# Patient Record
Sex: MALE | Race: WHITE
[De-identification: names, ages, dates, MRNs, and addresses within clinical notes are randomized per-mention and may not be internally consistent; named-entity substitution may affect disease eponyms.]

---

## 2017-04-05 ENCOUNTER — HOSPITAL ENCOUNTER (EMERGENCY)
Dept: HOSPITAL 58 - ED | Age: 37
Discharge: HOME | End: 2017-04-05

## 2017-04-05 VITALS — TEMPERATURE: 98.8 F | DIASTOLIC BLOOD PRESSURE: 79 MMHG | SYSTOLIC BLOOD PRESSURE: 127 MMHG

## 2017-04-05 VITALS — BODY MASS INDEX: 44.3 KG/M2

## 2017-04-05 DIAGNOSIS — L23.7: Primary | ICD-10-CM

## 2017-04-05 PROCEDURE — 96372 THER/PROPH/DIAG INJ SC/IM: CPT

## 2017-04-05 PROCEDURE — 99282 EMERGENCY DEPT VISIT SF MDM: CPT

## 2017-04-05 NOTE — ED.PDOC
General


ED Provider: 


Dr. JOSE MEYERS JR





Chief Complaint: Rash


Stated Complaint: clearing and burning field 3 days ago--felt itching start to 

left eyelid and area around eye--thinks it is poison ivy--rash starting to 

spread--redness noted--he states it has been draining[End]98.8 82 20 94 127/79 2

/10


Time Seen by Physician: 11:26


Mode of Arrival: Walk-In


Information Source: Patient


Exam Limitations: No limitations


Primary Care Provider: 


STORM BURRELL





Nursing and Triage Documentation Reviewed and Agree: No





Review of Systems





- Review Of Systems


Constitutional: Reports: No symptoms


Eyes: Reports: Drainage, Pain


Ears, Nose, Mouth, Throat: Denies: Ear pain


Respiratory: Reports: No symptoms


Cardiac: Reports: No symptoms


GI: Reports: No symptoms


: Reports: No symptoms


Musculoskeletal: Reports: No symptoms


Skin: Reports: Rash (edema red streaking)


Neurological: Reports: No symptoms


Endocrine: Reports: No symptoms


Hematologic/Lymphatic: Reports: No symptoms


All Other Systems: Other





Past Medical History





- Past Medical History


Previously Healthy: Yes


Endocrine: Reports: None


Cardiovascular: Reports: DVT (has seen Dr Prieto- now seeing nurse in [place]- 

recommend work up for thrombophlebitis and recurrent edema), Other (DVT LEFT 

LEG )


Respiratory: Reports: None


Hematological: Reports: None


Gastrointestinal: Reports: GERD


Genitourinary: Reports: None


Neuro/Psych: Reports: None


Musculoskeletal: Reports: None


Cancer: Reports: None


Other Pertinent Past Medical History: right chest wall mass(appears to have 

symmetric adipose tissue)





- Surgical History


General Surgical History: Reports: Other (KNOT ON TAILBONE[ End ]CYST ON 

TAILBONE)





- Family History


Family History: Reports: None





- Social History


Smoking Status: Never smoker


Hx Substance Use: Yes (IN THE PAST)


Alcohol Screening: Occasionally





Physical Exam





- Physical Exam


Appearance: Well-appearing, Obese


Pain Distress: Moderate


Eyes: ROSS, EOMI, Conjunctiva clear (left facial erythema and edema"I get this 

most every year")





Critical Care Note





- Critical Care Note


Total Time (mins): 0





Course





- Course


Vital Signs: 





 











  Temp Pulse Resp BP Pulse Ox


 


 04/05/17 11:03  98.8 F  81  20  127/79  94 L














Departure





- Departure


Time of Disposition: 12:18


Disposition: HOME SELF-CARE


Discharge Problem: 


 Pruritic rash





Instructions:  Poison Ivy (ED)


Condition: Good


Pt referred to PMD for follow-up: Yes


Additional Instructions: 


MAY BEGIN PREDNISONE DOSE PACK- ONLY IF RASH RETURNS


DISCUSS ALLERGY REFERRAL WITH YOUR PHYSICIAN


CONTINUE BENADRYL OR NON SEDATING ANTIHISTAMINE AS NEEDED FOR ITCHING


Allergies/Adverse Reactions: 


Allergies





No Known Allergies Allergy (Verified 04/05/17 11:10)


 








Home Medications: 


Ambulatory Orders





Fluoxetine HCl [Prozac] 20 mg PO DAILY 04/05/17 


Gabapentin 300 mg PO TID 04/05/17

## 2017-06-21 ENCOUNTER — HOSPITAL ENCOUNTER (OUTPATIENT)
Dept: HOSPITAL 58 - RAD | Age: 37
Discharge: HOME | End: 2017-06-21
Attending: PHYSICIAN ASSISTANT

## 2017-06-21 VITALS — BODY MASS INDEX: 44.3 KG/M2

## 2017-06-21 DIAGNOSIS — R60.0: Primary | ICD-10-CM

## 2017-06-21 DIAGNOSIS — R53.83: ICD-10-CM

## 2017-06-21 PROCEDURE — 36415 COLL VENOUS BLD VENIPUNCTURE: CPT

## 2017-06-21 PROCEDURE — 84403 ASSAY OF TOTAL TESTOSTERONE: CPT

## 2017-06-21 PROCEDURE — 83880 ASSAY OF NATRIURETIC PEPTIDE: CPT

## 2017-06-21 PROCEDURE — 93010 ELECTROCARDIOGRAM REPORT: CPT

## 2017-06-21 PROCEDURE — 93005 ELECTROCARDIOGRAM TRACING: CPT

## 2017-06-21 NOTE — DI
Exam:  Two x-rays of the chest. 

  

Comparison:  CTA performed 11/28/2014. 

  

Reason for exam:  Edema. 

  

FINDINGS:  No pneumothorax, pleural effusion, or focal consolidation.  The cardiac silhouette is not
 borderline within normal limits.  The imaged osseous structures are unremarkable. 

  

Impression:  No acute cardiopulmonary process.

## 2017-06-22 LAB — TESTOST SERPL-MCNC: 102 NG/DL (ref 348–1197)

## 2017-07-07 ENCOUNTER — HOSPITAL ENCOUNTER (OUTPATIENT)
Dept: HOSPITAL 58 - LAB | Age: 37
Discharge: HOME | End: 2017-07-07
Attending: PHYSICIAN ASSISTANT

## 2017-07-07 VITALS — BODY MASS INDEX: 44.3 KG/M2

## 2017-07-07 DIAGNOSIS — R79.89: Primary | ICD-10-CM

## 2017-07-07 PROCEDURE — 36415 COLL VENOUS BLD VENIPUNCTURE: CPT

## 2017-07-07 PROCEDURE — 84153 ASSAY OF PSA TOTAL: CPT

## 2017-07-22 ENCOUNTER — HOSPITAL ENCOUNTER (EMERGENCY)
Dept: HOSPITAL 58 - ED | Age: 37
Discharge: HOME | End: 2017-07-22

## 2017-07-22 VITALS — TEMPERATURE: 97.2 F | DIASTOLIC BLOOD PRESSURE: 83 MMHG | SYSTOLIC BLOOD PRESSURE: 142 MMHG

## 2017-07-22 VITALS — BODY MASS INDEX: 42.7 KG/M2

## 2017-07-22 DIAGNOSIS — T67.5XXA: Primary | ICD-10-CM

## 2017-07-22 LAB
ADD URINE MICROSCOPIC: YES
ALBUMIN SERPL-MCNC: 3.8 G/DL (ref 3.4–5)
ALBUMIN/GLOB SERPL: 1.06 {RATIO}
ALP SERPL-CCNC: 65 U/L (ref 50–136)
ALT SERPL-CCNC: 21 U/L (ref 12–78)
ANION GAP SERPL CALC-SCNC: 15 MMOL/L
AST SERPL-CCNC: 20 U/L (ref 15–37)
BASOPHILS # BLD AUTO: 0 K/UL (ref 0–0.2)
BASOPHILS NFR BLD AUTO: 0.3 % (ref 0–3)
BILIRUB SERPL-MCNC: 0.44 MG/DL (ref 0–1.2)
BUN SERPL-MCNC: 12 MG/DL (ref 7–18)
BUN/CREAT SERPL: 14.63
CALCIUM SERPL-MCNC: 9 MG/DL (ref 8.2–10.2)
CHLORIDE SERPL-SCNC: 106 MMOL/L (ref 98–107)
CO2 BLD-SCNC: 24 MMOL/L (ref 21–32)
CREAT SERPL-MCNC: 0.82 MG/DL (ref 0.6–1.1)
EOSINOPHIL # BLD AUTO: 0.2 K/UL (ref 0–0.7)
EOSINOPHIL NFR BLD AUTO: 2.2 % (ref 0–7)
GFR SERPLBLD BASED ON 1.73 SQ M-ARVRAT: 105 ML/MIN
GLOBULIN SER CALC-MCNC: 3.6 G/L
GLUCOSE SERPL-MCNC: 95 MG/DL (ref 70–100)
HCT VFR BLD AUTO: 40.5 % (ref 42–52)
HGB BLD-MCNC: 13.9 G/DL (ref 14–18)
IMM GRANULOCYTES NFR BLD AUTO: 0.3 % (ref 0–5)
LYMPHOCYTES # SPEC AUTO: 1.9 K/UL (ref 0.6–3.4)
LYMPHOCYTES NFR BLD AUTO: 24.7 % (ref 10–50)
MCH RBC QN: 31.8 PG (ref 27–31)
MCHC RBC AUTO-ENTMCNC: 34.3 G/DL (ref 31.8–35.4)
MCV RBC: 92.7 FL (ref 80–94)
MONOCYTES # BLD AUTO: 0.5 K/UL (ref 0.4–2)
MONOCYTES NFR BLD AUTO: 6.9 % (ref 0–10)
NEUTROPHILS # BLD AUTO: 5 K/UL (ref 2–6.9)
NEUTROPHILS NFR BLD AUTO: 65.6 %
PH UR: 5.5 [PH] (ref 5–9)
PLATELET # BLD AUTO: 198 10^3/UL (ref 140–440)
POTASSIUM SERPL-SCNC: 4 MMOL/L (ref 3.5–5.1)
PROT SERPL-MCNC: 7.4 G/DL (ref 6.4–8.2)
RBC # BLD AUTO: 4.37 10^6/UL (ref 4.7–6.1)
SODIUM SERPL-SCNC: 141 MMOL/L (ref 136–145)
SP GR UR: >=1.03 (ref 1–1.03)
WBC # BLD AUTO: 7.64 K/UL (ref 4.2–10.2)

## 2017-07-22 PROCEDURE — 99283 EMERGENCY DEPT VISIT LOW MDM: CPT

## 2017-07-22 PROCEDURE — 36415 COLL VENOUS BLD VENIPUNCTURE: CPT

## 2017-07-22 PROCEDURE — 81001 URINALYSIS AUTO W/SCOPE: CPT

## 2017-07-22 PROCEDURE — 80053 COMPREHEN METABOLIC PANEL: CPT

## 2017-07-22 PROCEDURE — 96372 THER/PROPH/DIAG INJ SC/IM: CPT

## 2017-07-22 PROCEDURE — 85025 COMPLETE CBC W/AUTO DIFF WBC: CPT

## 2017-07-22 NOTE — ED.PDOC
General


ED Provider: 


Dr. JOSE MEYERS JR





Chief Complaint: Nausea/Vomiting


Stated Complaint: patient states that he has been working outside in the heat.  

states he has been trying to drink and was doing ok until he ate yesterday.  

states that he ate and then started to vomit.  states every time he eats he 

vomits.[ End ]97.2 95 16 142/83


Time Seen by Physician: 10:24


Mode of Arrival: Walk-In


Information Source: Patient


Exam Limitations: No limitations


Primary Care Provider: 


LYNDSAY ABRAHAM





Nursing and Triage Documentation Reviewed and Agree: No





Review of Systems





- Review Of Systems


Constitutional: Reports: Malaise, Weakness


Eyes: Reports: No symptoms


Ears, Nose, Mouth, Throat: Reports: No symptoms


Respiratory: Reports: No symptoms


Cardiac: Reports: No symptoms


GI: Reports: Nausea, Poor appetite, Vomiting


: Reports: No symptoms


Musculoskeletal: Reports: No symptoms


Skin: Reports: Bruising


Neurological: Reports: Weakness


Endocrine: Reports: Excessive sweating


Hematologic/Lymphatic: Reports: Easy bruising


All Other Systems: Other





Past Medical History





- Past Medical History


Previously Healthy: Yes


Endocrine: Reports: None


Cardiovascular: Reports: DVT (has seen Dr Prieto- now seeing nurse in [place]- 

recommend work up for thrombophlebitis and recurrent edema), Other (DVT LEFT 

LEG )


Respiratory: Reports: None


Hematological: Reports: None


Gastrointestinal: Reports: GERD


Genitourinary: Reports: None


Neuro/Psych: Reports: None


Musculoskeletal: Reports: None


Cancer: Reports: None


Other Pertinent Past Medical History: right chest wall mass(appears to have 

symmetric adipose tissue)





- Surgical History


General Surgical History: Reports: Other (KNOT ON TAILBONE[ End ]CYST ON 

TAILBONE)





- Family History


Family History: Reports: None





- Social History


Smoking Status: Never smoker


Hx Substance Use: Yes (IN THE PAST)


Alcohol Screening: Occasionally





Physical Exam





- Physical Exam


Appearance: Well-appearing, Obese


Pain Distress: Moderate


Eyes: ROSS, EOMI, Conjunctiva clear


ENT: Ears normal, Nose normal, Oropharynx normal


Neck: Supple


Respiratory: Airway patent, Breath sounds clear, Breath sounds equal, 

Respirations nonlabored


Cardiovascular: RRR, Pulses normal, No rub, No murmur


GI/: Soft, Nontender, No masses, Bowel sounds normal, No Organomegaly


Musculoskeletal: Normal strength, ROM intact, No edema, No calf tenderness


Skin: Warm, Dry, Normal color (note abdominal macular ecchymoses)


Neurological: Sensation intact, Motor intact, Reflexes intact, Cranial nerves 

intact, Alert, Oriented





Critical Care Note





- Critical Care Note


Total Time (mins): 5





Course





- Course


Hematology/Chemistry: 


 07/22/17 10:45





 07/22/17 10:45


Orders, Labs, Meds: 


Lab Review











  07/22/17 07/22/17





  10:45 11:15


 


WBC  7.64 


 


RBC  4.37 L 


 


Hgb  13.9 L 


 


Hct  40.5 L 


 


MCV  92.7 


 


MCH  31.8 H 


 


MCHC  34.3 


 


RDW Coeff of Keeley  13.2 


 


Plt Count  198 


 


Immature Gran % (Auto)  0.3 


 


Neut % (Auto)  65.6 


 


Lymph % (Auto)  24.7 


 


Mono % (Auto)  6.9 


 


Eos % (Auto)  2.2 


 


Baso % (Auto)  0.3 


 


Immature Gran # (Auto)  0.0 


 


Neut #  5.0 


 


Lymph #  1.9 


 


Mono #  0.5 


 


Eos #  0.2 


 


Baso #  0.0 


 


Sodium  141 


 


Potassium  4.0 


 


Chloride  106 


 


Carbon Dioxide  24 


 


Anion Gap  15.0 


 


BUN  12 


 


Creatinine  0.82 


 


Estimated GFR (MDRD)  105.00 


 


BUN/Creatinine Ratio  14.63 


 


Glucose  95 


 


Calcium  9.0 


 


Total Bilirubin  0.44 


 


AST  20 


 


ALT  21 


 


Alkaline Phosphatase  65 


 


Total Protein  7.4 


 


Albumin  3.8 


 


Globulin  3.6 


 


Albumin/Globulin Ratio  1.06 


 


Urine Color   Yellow


 


Urine Clarity   Clear


 


Urine pH   5.5


 


Ur Specific Gravity   >=1.030


 


Urine Protein   Trace


 


Urine Glucose (UA)   Negative


 


Urine Ketones   Negative


 


Urine Blood   Negative


 


Urine Nitrite   Negative


 


Urine Bilirubin   Negative


 


Urine Urobilinogen   0.2


 


Ur Leukocyte Esterase   Negative


 


Ur Squamous Epith Cells   Not present


 


Urine Mucus   2+








Orders











 Category Date Time Status


 


 CBC W/ AUTO DIFF Stat LAB  07/22/17 10:45 Completed


 


 COMPREHENSIVE METABOLIC PANEL Stat LAB  07/22/17 10:45 Completed


 


 URINALYSIS C & S IF INDICATED Stat LAB  07/22/17 11:15 Completed


 


 Ondansetron HCl/Pf [Zofran 4 mg/2 ml] MEDS  07/22/17 10:25 Discontinued





 4 mg IM ONCE STA   








Medications














Discontinued Medications














Generic Name Dose Route Start Last Admin





  Trade Name Freq  PRN Reason Stop Dose Admin


 


Ondansetron HCl  4 mg  07/22/17 10:25  07/22/17 10:49





  Zofran 4 Mg/2 Ml  IM  07/22/17 10:26  4 mg





  ONCE STA   Administration











Vital Signs: 


 











  Temp Pulse Resp BP Pulse Ox


 


 07/22/17 10:15  97.2 F L  95 H  16  142/83 H  95














Departure





- Departure


Time of Disposition: 12:33


Disposition: HOME SELF-CARE


Discharge Problem: 


Heat exhaustion


Qualifiers:


 Encounter type: initial encounter Qualifier Code: (T67.5XXA) Heat exhaustion, 

unspecified, initial encounter





Instructions:  Heat Exhaustion (ED)


Condition: Good


Pt referred to PMD for follow-up: Yes


Additional Instructions: 


increase fluids 


avoid heat for next three days


when feeling well begin outdoor work for two hours only a day for 2-3 days 


then build up by two hours every 2-3 days until able to work through day





increase fluids- sufficient fluids will cause urination more than three times a 

day 


adequate fluids are indicated by urine that is without color


follow up PMD 3-4 days


discuss easy bruising and heat exhaustion


Allergies/Adverse Reactions: 


Allergies





No Known Allergies Allergy (Verified 07/22/17 10:18)


 








Home Medications: 


Ambulatory Orders





Buprenorphine HCl/Naloxone HCl [Suboxone 8 mg-2 mg Sl Film] 1 each SL BID 07/22/ 17 


Testosterone Cypionate [Depo-Testosterone] 100 mg IM AS DIRECTED 07/22/17

## 2017-08-09 ENCOUNTER — TRANSCRIBE ORDERS (OUTPATIENT)
Dept: ADMINISTRATIVE | Facility: HOSPITAL | Age: 37
End: 2017-08-09

## 2017-08-09 DIAGNOSIS — R20.2 PARESTHESIA: Primary | ICD-10-CM

## 2017-08-12 ENCOUNTER — HOSPITAL ENCOUNTER (OUTPATIENT)
Dept: HOSPITAL 58 - LAB | Age: 37
Discharge: HOME | End: 2017-08-12
Attending: PHYSICIAN ASSISTANT

## 2017-08-12 VITALS — BODY MASS INDEX: 42.7 KG/M2

## 2017-08-12 DIAGNOSIS — L28.2: Primary | ICD-10-CM

## 2017-08-12 DIAGNOSIS — E29.1: ICD-10-CM

## 2017-08-12 LAB
ALBUMIN SERPL-MCNC: 3.8 G/DL (ref 3.4–5)
ALBUMIN/GLOB SERPL: 1.12 {RATIO}
ALP SERPL-CCNC: 64 U/L (ref 50–136)
ALT SERPL-CCNC: 24 U/L (ref 12–78)
ANION GAP SERPL CALC-SCNC: 12.9 MMOL/L
AST SERPL-CCNC: 23 U/L (ref 15–37)
BASOPHILS # BLD AUTO: 0 K/UL (ref 0–0.2)
BASOPHILS NFR BLD AUTO: 0.3 % (ref 0–3)
BILIRUB SERPL-MCNC: 0.68 MG/DL (ref 0–1.2)
BUN SERPL-MCNC: 12 MG/DL (ref 7–18)
BUN/CREAT SERPL: 12.63
CALCIUM SERPL-MCNC: 9.3 MG/DL (ref 8.2–10.2)
CHLORIDE SERPL-SCNC: 101 MMOL/L (ref 98–107)
CO2 BLD-SCNC: 28 MMOL/L (ref 21–32)
CREAT SERPL-MCNC: 0.95 MG/DL (ref 0.6–1.1)
EOSINOPHIL # BLD AUTO: 0.2 K/UL (ref 0–0.7)
EOSINOPHIL NFR BLD AUTO: 2.5 % (ref 0–7)
GFR SERPLBLD BASED ON 1.73 SQ M-ARVRAT: 88 ML/MIN
GLOBULIN SER CALC-MCNC: 3.4 G/L
GLUCOSE SERPL-MCNC: 103 MG/DL (ref 70–100)
HCT VFR BLD AUTO: 41.2 % (ref 42–52)
HGB BLD-MCNC: 14.1 G/DL (ref 14–18)
IMM GRANULOCYTES NFR BLD AUTO: 0.2 % (ref 0–5)
LYMPHOCYTES # SPEC AUTO: 1.5 K/UL (ref 0.6–3.4)
LYMPHOCYTES NFR BLD AUTO: 22.9 % (ref 10–50)
MCH RBC QN: 31.6 PG (ref 27–31)
MCHC RBC AUTO-ENTMCNC: 34.2 G/DL (ref 31.8–35.4)
MCV RBC: 92.4 FL (ref 80–94)
MONOCYTES # BLD AUTO: 0.6 K/UL (ref 0.4–2)
MONOCYTES NFR BLD AUTO: 9 % (ref 0–10)
NEUTROPHILS # BLD AUTO: 4.1 K/UL (ref 2–6.9)
NEUTROPHILS NFR BLD AUTO: 65.1 %
PLATELET # BLD AUTO: 192 10^3/UL (ref 140–440)
POTASSIUM SERPL-SCNC: 3.9 MMOL/L (ref 3.5–5.1)
PROT SERPL-MCNC: 7.2 G/DL (ref 6.4–8.2)
RBC # BLD AUTO: 4.46 10^6/UL (ref 4.7–6.1)
SODIUM SERPL-SCNC: 138 MMOL/L (ref 136–145)
WBC # BLD AUTO: 6.34 K/UL (ref 4.2–10.2)

## 2017-08-12 PROCEDURE — 84403 ASSAY OF TOTAL TESTOSTERONE: CPT

## 2017-08-12 PROCEDURE — 84402 ASSAY OF FREE TESTOSTERONE: CPT

## 2017-08-12 PROCEDURE — 80053 COMPREHEN METABOLIC PANEL: CPT

## 2017-08-12 PROCEDURE — 36415 COLL VENOUS BLD VENIPUNCTURE: CPT

## 2017-08-12 PROCEDURE — 85025 COMPLETE CBC W/AUTO DIFF WBC: CPT

## 2017-08-14 LAB — TESTOST SERPL-MCNC: 382 NG/DL (ref 264–916)

## 2017-08-29 RX ORDER — BUPRENORPHINE HYDROCHLORIDE AND NALOXONE HYDROCHLORIDE DIHYDRATE 8; 2 MG/1; MG/1
3 TABLET SUBLINGUAL DAILY
COMMUNITY

## 2017-08-29 RX ORDER — EPINEPHRINE 0.15 MG/.3ML
INJECTION INTRAMUSCULAR
COMMUNITY

## 2017-08-29 RX ORDER — CEPHALEXIN 500 MG/1
500 CAPSULE ORAL 2 TIMES DAILY
COMMUNITY
End: 2018-05-13

## 2017-08-29 RX ORDER — TESTOSTERONE CYPIONATE 200 MG/ML
200 INJECTION, SOLUTION INTRAMUSCULAR
COMMUNITY
End: 2018-05-13

## 2017-08-29 RX ORDER — BACITRACIN ZINC 500 [USP'U]/G
OINTMENT TOPICAL 2 TIMES DAILY
COMMUNITY
End: 2018-01-15

## 2017-09-07 ENCOUNTER — HOSPITAL ENCOUNTER (OUTPATIENT)
Dept: HOSPITAL 58 - CAR | Age: 37
Discharge: HOME | End: 2017-09-07
Attending: PHYSICIAN ASSISTANT

## 2017-09-07 VITALS — BODY MASS INDEX: 42.7 KG/M2

## 2017-09-07 DIAGNOSIS — E29.1: Primary | ICD-10-CM

## 2017-09-07 DIAGNOSIS — R60.0: ICD-10-CM

## 2017-09-07 LAB
ALBUMIN SERPL-MCNC: 3.6 G/DL (ref 3.4–5)
ALBUMIN/GLOB SERPL: 0.95 {RATIO}
ALP SERPL-CCNC: 70 U/L (ref 50–136)
ALT SERPL-CCNC: 18 U/L (ref 12–78)
ANION GAP SERPL CALC-SCNC: 13.2 MMOL/L
AST SERPL-CCNC: 17 U/L (ref 15–37)
BASOPHILS # BLD AUTO: 0 K/UL (ref 0–0.2)
BASOPHILS NFR BLD AUTO: 0.3 % (ref 0–3)
BILIRUB SERPL-MCNC: 0.44 MG/DL (ref 0–1.2)
BUN SERPL-MCNC: 14 MG/DL (ref 7–18)
BUN/CREAT SERPL: 15.21
CALCIUM SERPL-MCNC: 9.3 MG/DL (ref 8.2–10.2)
CHLORIDE SERPL-SCNC: 102 MMOL/L (ref 98–107)
CHOLEST SERPL-MCNC: 224 MG/DL (ref 0–200)
CHOLEST/HDLC SERPL: 5.9 {RATIO} (ref 4.5–6.4)
CO2 BLD-SCNC: 28 MMOL/L (ref 21–32)
CREAT SERPL-MCNC: 0.92 MG/DL (ref 0.6–1.1)
EOSINOPHIL # BLD AUTO: 0.2 K/UL (ref 0–0.7)
EOSINOPHIL NFR BLD AUTO: 2.7 % (ref 0–7)
GFR SERPLBLD BASED ON 1.73 SQ M-ARVRAT: 92 ML/MIN
GLOBULIN SER CALC-MCNC: 3.8 G/L
GLUCOSE SERPL-MCNC: 98 MG/DL (ref 70–100)
HCT VFR BLD AUTO: 42.6 % (ref 42–52)
HDLC SERPL-MCNC: 38 MG/DL (ref 35–60)
HGB BLD-MCNC: 14.5 G/DL (ref 14–18)
IMM GRANULOCYTES NFR BLD AUTO: 0.3 % (ref 0–5)
LYMPHOCYTES # SPEC AUTO: 2 K/UL (ref 0.6–3.4)
LYMPHOCYTES NFR BLD AUTO: 32.8 % (ref 10–50)
MCH RBC QN: 31.3 PG (ref 27–31)
MCHC RBC AUTO-ENTMCNC: 34 G/DL (ref 31.8–35.4)
MCV RBC: 91.8 FL (ref 80–94)
MONOCYTES # BLD AUTO: 0.5 K/UL (ref 0.4–2)
MONOCYTES NFR BLD AUTO: 7.7 % (ref 0–10)
NEUTROPHILS # BLD AUTO: 3.4 K/UL (ref 2–6.9)
NEUTROPHILS NFR BLD AUTO: 56.2 %
PLATELET # BLD AUTO: 208 10^3/UL (ref 140–440)
POTASSIUM SERPL-SCNC: 4.2 MMOL/L (ref 3.5–5.1)
PROT SERPL-MCNC: 7.4 G/DL (ref 6.4–8.2)
RBC # BLD AUTO: 4.64 10^6/UL (ref 4.7–6.1)
SODIUM SERPL-SCNC: 139 MMOL/L (ref 136–145)
TRIGL SERPL-MCNC: 161 MG/DL (ref 30–150)
VLDLC SERPL CALC-MCNC: 32 MG/DL (ref 2–30)
WBC # BLD AUTO: 6 K/UL (ref 4.2–10.2)

## 2017-09-07 PROCEDURE — 80053 COMPREHEN METABOLIC PANEL: CPT

## 2017-09-07 PROCEDURE — 85025 COMPLETE CBC W/AUTO DIFF WBC: CPT

## 2017-09-07 PROCEDURE — 80061 LIPID PANEL: CPT

## 2017-09-07 PROCEDURE — 36415 COLL VENOUS BLD VENIPUNCTURE: CPT

## 2017-09-07 NOTE — ECHO2D
Date of Exam: 9/7/17   Ordering Physician: LYNDSAY ABRAHAM            

Reason for Echo: EDEMA LOWER EXTREMITIES







M-Mode  Normal Adult Results LV Dimensions Normal Adult Results

 

AoV Opening excursions       >1.6  >1.6 LVEDD-base-     3.5-5.8  5.0

 

Ao root dimensions      2.0-3.7  3.1 LVESD-base-     3.1-4.6  

 

L. Atrium dimensions      1.9-3.8  4.9 Post. Wall thickness     0.8-1.1  1.1

 

IV septum (thickness)      0.7-1.2  1.3 Post. Wall excursion     0.72-1.3  
NORMAL

 

Septal motion   NORMAL Systolic motion   

 

R. Ventricular cavity     1.5-2.0  NORMAL LVEF       60%  54%

 

Paradoxical septal wall motion   NORMAL    



2-D : ENLARGED LEFT ATRIAL CAVITY--NORMAL LEFT VENTRICULAR CONTRACTILITY--
NORMAL VALVES-NO EFFUSION, NO THROMBUS





M-MODE:

MV:  NORMAL

AV:  NORMAL

TV:  NORMAL

PV:  

CHAMBER SIZE:  ENLARGED LEFT ATRIAL CAVITY

WALL MOTION:  NORMAL

PERICARDIUM:  NORMAL



INTERPRETATION:  

1. LEFT VENTRICULAR HYPERTROPHY

2. ENLARGED LEFT ATRIAL CAVITY

3. NORMAL LEFT VENTRICULAR CONTRACTILITY

4. NORMAL VALVES

MTDD

## 2017-09-13 ENCOUNTER — APPOINTMENT (OUTPATIENT)
Dept: NEUROLOGY | Facility: HOSPITAL | Age: 37
End: 2017-09-13

## 2017-10-03 ENCOUNTER — TELEPHONE (OUTPATIENT)
Dept: VASCULAR SURGERY | Facility: CLINIC | Age: 37
End: 2017-10-03

## 2017-10-11 ENCOUNTER — TELEPHONE (OUTPATIENT)
Dept: PODIATRY | Facility: CLINIC | Age: 37
End: 2017-10-11

## 2017-10-11 NOTE — TELEPHONE ENCOUNTER
Spoke with Mr. De Luna and reminded him of his appointment at 10 am, he stated he would be here his toes were killing him. He thanked us for the reminder as he thought the appointment was this morning and that he had overslept. He says he looks forward to seeing us in the morning.

## 2017-10-31 ENCOUNTER — HOSPITAL ENCOUNTER (OUTPATIENT)
Dept: HOSPITAL 58 - RAD | Age: 37
Discharge: HOME | End: 2017-10-31
Attending: PHYSICIAN ASSISTANT

## 2017-10-31 VITALS — BODY MASS INDEX: 42.7 KG/M2

## 2017-10-31 DIAGNOSIS — M54.5: Primary | ICD-10-CM

## 2017-10-31 NOTE — CT
EXAM:  CT abdomen pelvis without contrast 

  

HISTORY:  Low back pain, right sciatic pain into groin 

  

COMPARISON:  09/10/2016 

  

TECHNIQUE:  CT abdomen pelvis performed without intravenous contrast.  Coronal and sagittal reformatt
ed images obtained. 

  

FINDINGS:  Lung bases clear.  No free air.  No acute abnormalities of the bones.  Degenerative change
 in the spine with multilevel neural foraminal narrowing, appears moderate to severe at L5-S1.  Heart
 normal in size.  Liver appears normal.  Gallbladder appears normal.  Pancreas appears normal.  Splee
n appears normal.  Adrenals appear normal.  Kidneys appear normal without hydronephrosis or nephrolit
hiasis.  No calculi visualized in normal course of the ureters.  Bladder unremarkable.  Prostate norm
al in size.  Small bilateral fat containing inguinal hernias.  Small fat-containing umbilical hernia.
  Aorta normal in caliber.  No lymphadenopathy or ascites.  Small hiatal hernia.  No dilated loops sm
all bowel.  Appendix appears normal.  Colon unremarkable.  No inflammatory stranding identified in th
e abdomen pelvis. 

  

IMPRESSION: 

1.  No hydronephrosis or nephrolithiasis.  No acute inflammatory process identified in the abdomen or
 pelvis. 

2.  Degenerative changes in the spine with multilevel neural foraminal narrowing, appears moderate to
 severe at L5-S1.  Findings can be correlate with MRI lumbar spine as indicated. 

3.  Small bilateral fat-containing inguinal hernias.  Small fat-containing umbilical hernia. 

4.  Small hiatal hernia.

## 2017-12-04 ENCOUNTER — HOSPITAL ENCOUNTER (OUTPATIENT)
Dept: HOSPITAL 58 - RAD | Age: 37
Discharge: HOME | End: 2017-12-04
Attending: PHYSICIAN ASSISTANT

## 2017-12-04 VITALS — BODY MASS INDEX: 42.7 KG/M2

## 2017-12-04 DIAGNOSIS — M79.604: ICD-10-CM

## 2017-12-04 DIAGNOSIS — R60.0: Primary | ICD-10-CM

## 2017-12-04 DIAGNOSIS — R06.00: ICD-10-CM

## 2017-12-04 LAB
ALBUMIN SERPL-MCNC: 3.9 G/DL (ref 3.4–5)
ALBUMIN/GLOB SERPL: 1.05 {RATIO}
ALP SERPL-CCNC: 70 U/L (ref 50–136)
ALT SERPL-CCNC: 24 U/L (ref 12–78)
ANION GAP SERPL CALC-SCNC: 13.2 MMOL/L
AST SERPL-CCNC: 21 U/L (ref 15–37)
BILIRUB SERPL-MCNC: 0.47 MG/DL (ref 0–1.2)
BUN SERPL-MCNC: 12 MG/DL (ref 7–18)
BUN/CREAT SERPL: 14.11
CALCIUM SERPL-MCNC: 9.4 MG/DL (ref 8.2–10.2)
CHLORIDE SERPL-SCNC: 105 MMOL/L (ref 98–107)
CO2 BLD-SCNC: 28 MMOL/L (ref 21–32)
CREAT SERPL-MCNC: 0.85 MG/DL (ref 0.6–1.1)
GFR SERPLBLD BASED ON 1.73 SQ M-ARVRAT: 100 ML/MIN
GLOBULIN SER CALC-MCNC: 3.7 G/L
GLUCOSE SERPL-MCNC: 101 MG/DL (ref 70–100)
POTASSIUM SERPL-SCNC: 4.2 MMOL/L (ref 3.5–5.1)
PROT SERPL-MCNC: 7.6 G/DL (ref 6.4–8.2)
SODIUM SERPL-SCNC: 142 MMOL/L (ref 136–145)
VITAMIN D25: 25 NG/ML (ref 20–50)

## 2017-12-04 PROCEDURE — 82306 VITAMIN D 25 HYDROXY: CPT

## 2017-12-04 PROCEDURE — 36415 COLL VENOUS BLD VENIPUNCTURE: CPT

## 2017-12-04 PROCEDURE — 80053 COMPREHEN METABOLIC PANEL: CPT

## 2017-12-04 NOTE — DI
EXAM:  CHEST FRONTAL AND LATERAL VIEWS 

  

HISTORY:  Dyspnea. 

COMPARISON:  06/21/2017 

  

FINDINGS:    Heart size and mediastinal contour remain within normal limits.  No acute infiltrates.  
Normal vascularity with no pleural fluid or pneumothorax. The bony thorax has no acute finding. 

  

IMPRESSION:  No acute process.

## 2017-12-08 ENCOUNTER — HOSPITAL ENCOUNTER (OUTPATIENT)
Dept: HOSPITAL 58 - RAD | Age: 37
Discharge: HOME | End: 2017-12-08
Attending: PHYSICIAN ASSISTANT

## 2017-12-08 VITALS — BODY MASS INDEX: 42.7 KG/M2

## 2017-12-08 DIAGNOSIS — R60.9: Primary | ICD-10-CM

## 2017-12-09 NOTE — US
EXAM: Bilateral lower extremity venous Doppler 

  

HISTORY:  Concern for DVT with left lower extremity edema and prior DVT. 

  

COMPARISON:  Venous Doppler 08/08/2014 

  

TECHNIQUE:  Sonographic and Doppler evaluation of the bilateral lower extremity vessels from the comm
on femoral through the anterior tibial veins were obtained.  Augmentation and compression techniques 
were also performed. 

  

FINDINGS:  There is spontaneous Doppler flow seen in the bilateral lower extremity veins from the com
mon femoral through the anterior tibial veins.  There is normal compression and augmentation througho
ut the lower extremity veins.  Sonographic appearance of the soft tissues demonstrate an anechoic flu
id collection in the right popliteal fossa measuring 3.4 x 0.6 x 1 point 6 cm with no internal color 
Doppler flow. 

  

IMPRESSION: 

1.  No lower extremity thrombus. 

2.  Anechoic fluid collection in the right popliteal fossa most consistent with a Baker's cyst.

## 2018-01-08 ENCOUNTER — HOSPITAL ENCOUNTER (OUTPATIENT)
Dept: ULTRASOUND IMAGING | Age: 38
Discharge: HOME | End: 2018-01-08
Payer: MEDICAID

## 2018-01-08 DIAGNOSIS — N18.30 CHRONIC KIDNEY DISEASE, STAGE III (MODERATE) (HCC): ICD-10-CM

## 2018-01-08 PROCEDURE — 76770 US EXAM ABDO BACK WALL COMP: CPT

## 2018-01-15 ENCOUNTER — OFFICE VISIT (OUTPATIENT)
Dept: CARDIOLOGY | Facility: CLINIC | Age: 38
End: 2018-01-15

## 2018-01-15 VITALS
BODY MASS INDEX: 43.5 KG/M2 | DIASTOLIC BLOOD PRESSURE: 80 MMHG | SYSTOLIC BLOOD PRESSURE: 126 MMHG | OXYGEN SATURATION: 98 % | HEART RATE: 89 BPM | HEIGHT: 68 IN | WEIGHT: 287 LBS

## 2018-01-15 DIAGNOSIS — E66.01 MORBID OBESITY (HCC): ICD-10-CM

## 2018-01-15 DIAGNOSIS — I51.7 LVH (LEFT VENTRICULAR HYPERTROPHY): Primary | ICD-10-CM

## 2018-01-15 DIAGNOSIS — R60.0 LOWER EXTREMITY EDEMA: ICD-10-CM

## 2018-01-15 PROBLEM — R79.89 LOW TESTOSTERONE: Status: ACTIVE | Noted: 2018-01-15

## 2018-01-15 PROBLEM — F11.11 OPIOID ABUSE, IN REMISSION: Status: ACTIVE | Noted: 2018-01-15

## 2018-01-15 PROCEDURE — 93000 ELECTROCARDIOGRAM COMPLETE: CPT | Performed by: INTERNAL MEDICINE

## 2018-01-15 PROCEDURE — 99203 OFFICE O/P NEW LOW 30 MIN: CPT | Performed by: INTERNAL MEDICINE

## 2018-01-15 NOTE — PROGRESS NOTES
Reason for Visit: LVH.    HPI:  Waylon De Luna is a 37 y.o. male is being seen for consultation today at the request of RENETTA Prieto.  He had an echo done back in September that demonstrated very mild left ventricular hypertrophy with interventricular septal thickness 1.3 cm.  The echo was ordered secondary to lower extremity edema.  He has been getting injections of testosterone due to low testosterone which is on hold until he has a cardiac evaluation.  He has proteinuria which has been evaluated by a nephrologist and has work up ongoing.  He denies any cardiac symptoms including chest pain, palpitations, dizziness, syncope, PND, or orthopnea.  He is working to try to lose weight.  He takes Lasix intermittently for the edema and notes that it has now resolved with this.      Previous Cardiac Testing and Procedures:  - Echo (09/07/2017) EF 60%, mild LVH with IVS 1.3 cm, LVPWd 1.1, LA enlargement    Patient Active Problem List   Diagnosis   • Morbid obesity   • LVH (left ventricular hypertrophy)   • Opioid abuse, in remission   • Low testosterone       Social History   Substance Use Topics   • Smoking status: Never Smoker   • Smokeless tobacco: Never Used   • Alcohol use No       Family History   Problem Relation Age of Onset   • Other Maternal Uncle    • Other Paternal Aunt    • Heart failure Maternal Grandfather        The following portions of the patient's history were reviewed and updated as appropriate: allergies, current medications, past family history, past medical history, past social history, past surgical history and problem list.      Current Outpatient Prescriptions:   •  buprenorphine-naloxone (SUBOXONE) 8-2 MG per SL tablet, Place 3 tablets under the tongue Daily., Disp: , Rfl:   •  cephalexin (KEFLEX) 500 MG capsule, Take 500 mg by mouth 2 (Two) Times a Day., Disp: , Rfl:   •  EPINEPHrine (EPIPEN JR) 0.15 MG/0.3ML solution auto-injector injection, , Disp: , Rfl:   •  Testosterone Cypionate  "(DEPOTESTOTERONE CYPIONATE) 200 MG/ML injection, Inject 200 mg into the shoulder, thigh, or buttocks Every 30 (Thirty) Days., Disp: , Rfl:     Review of Systems   Constitution: Negative for chills, fever and weight loss.   HENT: Negative for sore throat.    Eyes: Negative for blurred vision and visual disturbance.   Cardiovascular: Negative for chest pain, dyspnea on exertion, leg swelling, palpitations, paroxysmal nocturnal dyspnea and syncope.   Respiratory: Negative for cough and shortness of breath.    Endocrine: Negative for cold intolerance and polyuria.   Skin: Negative for itching and rash.   Musculoskeletal: Negative for joint swelling and myalgias.   Gastrointestinal: Negative for abdominal pain, diarrhea, heartburn and vomiting.   Genitourinary: Negative for dysuria and hematuria.   Neurological: Negative for dizziness, headaches and numbness.   Psychiatric/Behavioral: Negative for depression. The patient is not nervous/anxious.    Allergic/Immunologic: Negative for hives.       Objective   /80 (BP Location: Left arm, Patient Position: Sitting, Cuff Size: Large Adult)  Pulse 89  Ht 172.7 cm (68\")  Wt 130 kg (287 lb)  SpO2 98%  BMI 43.64 kg/m2  Physical Exam   Constitutional: He is oriented to person, place, and time. He appears well-developed and well-nourished.   HENT:   Head: Normocephalic and atraumatic.   Eyes: Conjunctivae and EOM are normal. Pupils are equal, round, and reactive to light.   Neck: Normal range of motion. Neck supple. No JVD present. No thyromegaly present.   Cardiovascular: Normal rate, regular rhythm and normal heart sounds.    No murmur heard.  Pulmonary/Chest: Effort normal and breath sounds normal. He has no wheezes. He has no rales.   Abdominal: Soft. Bowel sounds are normal. He exhibits distension (Obese). There is no tenderness.   Musculoskeletal: Normal range of motion. He exhibits no edema.   Neurological: He is alert and oriented to person, place, and time. " Coordination normal.   Skin: Skin is warm and dry. No rash noted.   Psychiatric: He has a normal mood and affect. His behavior is normal.       ECG 12 Lead  Date/Time: 1/15/2018 11:19 AM  Performed by: LUIS HER  Authorized by: LUIS HER   Comparison: compared with previous ECG from 6/21/2017  Similar to previous ECG  Rhythm: sinus rhythm  Rate: normal  Clinical impression: normal ECG              ICD-10-CM ICD-9-CM   1. LVH (left ventricular hypertrophy) I51.7 429.3   2. Lower extremity edema R60.0 782.3   3. Morbid obesity E66.01 278.01         Assessment/Plan:  1.  LVH: Reviewed recent echo.  Very mild left ventricular hypertrophy of the interventricular septum at 1.3 cm with normal range being 0.7-1.2 cm.  His posterior wall thickness is normal.  This is not the cause of his lower extremity edema.    2.  Lower extremity edema: Likely secondary to morbid obesity.  Reported proteinuria which could also contribute.      3.  Morbid obesity: BMI 43.6.   on diet, excise, weight loss.

## 2018-01-22 ENCOUNTER — HOSPITAL ENCOUNTER (OUTPATIENT)
Dept: HOSPITAL 58 - LAB | Age: 38
Discharge: HOME | End: 2018-01-22
Attending: PHYSICIAN ASSISTANT

## 2018-01-22 ENCOUNTER — HOSPITAL ENCOUNTER (EMERGENCY)
Dept: HOSPITAL 58 - ED | Age: 38
Discharge: HOME | End: 2018-01-22

## 2018-01-22 VITALS — SYSTOLIC BLOOD PRESSURE: 140 MMHG | TEMPERATURE: 97.4 F | DIASTOLIC BLOOD PRESSURE: 82 MMHG

## 2018-01-22 VITALS — BODY MASS INDEX: 42.7 KG/M2

## 2018-01-22 VITALS — BODY MASS INDEX: 45.1 KG/M2

## 2018-01-22 DIAGNOSIS — I82.402: ICD-10-CM

## 2018-01-22 DIAGNOSIS — Z86.718: ICD-10-CM

## 2018-01-22 DIAGNOSIS — R60.0: Primary | ICD-10-CM

## 2018-01-22 DIAGNOSIS — R22.2: ICD-10-CM

## 2018-01-22 DIAGNOSIS — R06.02: ICD-10-CM

## 2018-01-22 DIAGNOSIS — R79.1: ICD-10-CM

## 2018-01-22 PROCEDURE — 85027 COMPLETE CBC AUTOMATED: CPT

## 2018-01-22 PROCEDURE — 80053 COMPREHEN METABOLIC PANEL: CPT

## 2018-01-22 PROCEDURE — 85379 FIBRIN DEGRADATION QUANT: CPT

## 2018-01-22 PROCEDURE — 36415 COLL VENOUS BLD VENIPUNCTURE: CPT

## 2018-01-22 PROCEDURE — 83880 ASSAY OF NATRIURETIC PEPTIDE: CPT

## 2018-01-22 PROCEDURE — 99283 EMERGENCY DEPT VISIT LOW MDM: CPT

## 2018-01-22 NOTE — CT
EXAM:  CT pulmonary angiogram. 

  

HISTORY:  Elevated D-dimer.  Leg swelling.  Shortness of breath.  Evaluate for pulmonary embolism. 

  

PROCEDURE:  After the intravenous injection of contrast a CT pulmonary angiogram was performed with c
ontiguous axial CT images of the chest and multiplanar and 3-D reformats. 

  

FINDINGS: There is normal enhancement of the pulmonary arteries with no evidence of pulmonary embolis
m.  The heart is within normal limits in size.  The thoracic aorta is within normal limits in diamete
r. The mediastinum is normal in appearance. There is minimal bibasilar dependent atelectasis. There a
re degenerative changes in the spine. The adrenal glands and liver are normal in appearance. 

  

Impression:  No evidence of pulmonary embolism. 

  

Minimal bibasilar dependent atelectasis.

## 2018-01-22 NOTE — ED.PDOC
General


ED Provider: 


Dr. NATTY PRIETO





Chief Complaint: Non-specific Complaint


Stated Complaint: Patient seen PA, for the leg edema, she ran some labs it 

showed Elevated D-dimer.  he has some shortness of breath.


Time Seen by Physician: 22:49


Mode of Arrival: Walk-In


Information Source: Patient


Primary Care Provider: 


LYNDSAY ABRAHAM





Nursing and Triage Documentation Reviewed and Agree: Yes


Reviewed sepsis parameters & appropriate labs ordered?: No


System Inflammatory Response Syndrome: Not Applicable


Sepsis Protocol: 


For patient's 13 years and over:





Temp is 96.8 and below  and greater


Pulse >90 BPM


Resp >20/minute


Acutely Altered Mental Status





Are patient's symptoms suggestive of a new infection, such as:


   -Pneumonia


   -Skin, Soft Tissue


   -Endocarditis


   -UTI


   -Bone, Joint Infection


   -Implantable Device


   -Acute Abdominal Infection


   -Wound Infection


   -Meningitis


   -Blood Stream Catheter Infection


   -Unknown








Respiratory Complaint Exam





- Shortness of Air Complaint/Exam


Symptoms Are: Resolved


Timing: Intermittent


Initial Severity: Moderate


Current Severity: Mild


Character: Reports: Dyspnea on exertion


Aggravating: Reports: None


Alleviating: Reports: None


Associated Signs and Symptoms: Reports: Edema.  Denies: Cough, Wheezing, Chest 

pain with cough, Chest pain, Fever, Chills, Diaphoresis, Nasal congestion, 

Dizziness, Calf pain, Calf swelling, Rapid breathing, Labored breathing, 

Decreased intake


Related History: Reports: Similar episode (h/o leg edema for 3-4 years)


History of Healthcare-Acquired Pneumonia: No


Pulmonary Embolism Risk Factors: Reports: None


Cardiac Risk Factors: Reports: None


Pseudomonas Risk Factors: Reports: None


Tuberculosis Risk Factors: Reports: None


Home Oxygen Use: No


Recent Stress Test: No


Recent Echo/LV Function: No


Respiratory Distress: None


Stridor Present: No


Tracheal Deviation: No


Subcutaneous Emphysema: No


Accessory Muscle Use: No


Retractions: Not Present


Diminished Breath Sounds: No


Prolonged Expiratory Phase: No


Unable to Speak Full Sentences: No


Fatigue: No


Leg Swelling: No


Eric's Sign Present: No


Grunting Respirations: No


Kussmaul Respirations: No


Differential Diagnoses: Pneumonia, Pulmonary Embolism





Review of Systems





- Review Of Systems


Constitutional: Reports: No symptoms


Eyes: Reports: No symptoms


Ears, Nose, Mouth, Throat: Reports: No symptoms


Respiratory: Reports: Short of air


Cardiac: Reports: Edema


GI: Reports: No symptoms


: Reports: No symptoms


Musculoskeletal: Reports: No symptoms


Skin: Reports: No symptoms


Neurological: Reports: No symptoms


Endocrine: Reports: No symptoms


Hematologic/Lymphatic: Reports: No symptoms


All Other Systems: Reviewed and Negative





Past Medical History





- Past Medical History


Previously Healthy: Yes


Endocrine: Reports: None


Cardiovascular: Reports: DVT (has seen Dr Prieto- now seeing nurse in [place]- 

recommend work up for thrombophlebitis and recurrent edema), Other (DVT LEFT 

LEG )


Respiratory: Reports: None


Hematological: Reports: None


Gastrointestinal: Reports: GERD


Genitourinary: Reports: None


Neuro/Psych: Reports: None


Musculoskeletal: Reports: None


Cancer: Reports: None


Other Pertinent Past Medical History: right chest wall mass(appears to have 

symmetric adipose tissue)





- Surgical History


General Surgical History: Reports: Other (KNOT ON TAILBONE[ End ]CYST ON 

TAILBONE)





- Family History


Family History: Reports: None





- Social History


Smoking Status: Never smoker


Hx Substance Use: Yes (IN THE PAST (OPIATES))


Alcohol Screening: Occasionally





- Immunizations


Tetanus Shot up to Date: Yes





Physical Exam





- Physical Exam


Appearance: Well-appearing, No pain distress, Well-nourished


Eyes: ROSS, EOMI, Conjunctiva clear


ENT: Ears normal, Nose normal, Oropharynx normal


Respiratory: Airway patent, Breath sounds clear, Breath sounds equal, 

Respirations nonlabored


Cardiovascular: RRR, Pulses normal, No rub, No murmur


GI/: Soft, Nontender, No masses, Bowel sounds normal, No Organomegaly


Musculoskeletal: Normal strength, ROM intact, No edema, No calf tenderness


Skin: Warm, Dry, Normal color


Neurological: Sensation intact, Motor intact, Reflexes intact, Cranial nerves 

intact, Alert, Oriented


Psychiatric: Affect appropriate, Mood appropriate





Interpretation





- Radiology Interpretation


Radiology Interpretation By: Radiologist


Radiology Results: Negative


Exam Interpreted: CT Scan





Critical Care Note





- Critical Care Note


Total Time (mins): 15





Course





- Course


Orders, Labs, Meds: 


Lab Review











  01/22/18





  17:05


 


B-Natriuretic Peptide  42








Orders











 Category Date Time Status


 


 NPO REMINDER: IMAGING ONCE CARE  01/22/18 21:49 Completed


 


 ED IV/MEDIPORT/POWERPORT .ONCE EMERGENCY  01/22/18 21:49 Active


 


 B-TYPE NATRIURETIC PEPTIDE Stat LAB  01/22/18 17:05 Completed


 


 0.9 % Sodium Chloride [Saline Flush] MEDS  01/22/18 21:49 Ordered





 1 syr IVF PRN PRN   


 


 CT CHEST PE PROTOCOL Stat RADS  01/22/18 21:49 Completed








Medications











Generic Name Dose Route Start Last Admin





  Trade Name Freq  PRN Reason Stop Dose Admin


 


Sodium Chloride  1 syr  01/22/18 21:49  





  Saline Flush  IVF   





  PRN PRN   





  To flush IV   











Vital Signs: 


 











  Temp Pulse Resp BP Pulse Ox


 


 01/22/18 21:11  97.4 F L  77  20  140/82  94 L














Departure





- Departure


Time of Disposition: 23:24


Disposition: HOME SELF-CARE


Discharge Problem: 


 Leg edema





Instructions:  Lymphedema (ED)


Condition: Stable


Pt referred to PMD for follow-up: Yes


IPMP verified?: No


Additional Instructions: 


Keep legs elevated


No Salt diet


Keep taking Lasix.


Need f/u with Vein doctor,








Allergies/Adverse Reactions: 


Allergies





No Known Allergies Allergy (Verified 01/22/18 21:20)


 








Home Medications: 


Ambulatory Orders





Buprenorphine HCl/Naloxone HCl [Suboxone 8 mg-2 mg Sl Film] 1 each SL TID 07/22/ 17 








Disposition Discussed With: Patient

## 2018-02-23 ENCOUNTER — HOSPITAL ENCOUNTER (EMERGENCY)
Dept: HOSPITAL 58 - ED | Age: 38
Discharge: HOME | End: 2018-02-23

## 2018-02-23 VITALS — BODY MASS INDEX: 41.7 KG/M2

## 2018-02-23 VITALS — SYSTOLIC BLOOD PRESSURE: 118 MMHG | TEMPERATURE: 99 F | DIASTOLIC BLOOD PRESSURE: 84 MMHG

## 2018-02-23 DIAGNOSIS — Z86.718: ICD-10-CM

## 2018-02-23 DIAGNOSIS — M79.652: ICD-10-CM

## 2018-02-23 DIAGNOSIS — R60.0: Primary | ICD-10-CM

## 2018-02-23 PROCEDURE — 81001 URINALYSIS AUTO W/SCOPE: CPT

## 2018-02-23 PROCEDURE — 82553 CREATINE MB FRACTION: CPT

## 2018-02-23 PROCEDURE — 36415 COLL VENOUS BLD VENIPUNCTURE: CPT

## 2018-02-23 PROCEDURE — 93010 ELECTROCARDIOGRAM REPORT: CPT

## 2018-02-23 PROCEDURE — 85025 COMPLETE CBC W/AUTO DIFF WBC: CPT

## 2018-02-23 PROCEDURE — 84484 ASSAY OF TROPONIN QUANT: CPT

## 2018-02-23 PROCEDURE — 82550 ASSAY OF CK (CPK): CPT

## 2018-02-23 PROCEDURE — 80306 DRUG TEST PRSMV INSTRMNT: CPT

## 2018-02-23 PROCEDURE — 93005 ELECTROCARDIOGRAM TRACING: CPT

## 2018-02-23 PROCEDURE — 99283 EMERGENCY DEPT VISIT LOW MDM: CPT

## 2018-02-23 PROCEDURE — 80307 DRUG TEST PRSMV CHEM ANLYZR: CPT

## 2018-02-23 PROCEDURE — 82542 COL CHROMOTOGRAPHY QUAL/QUAN: CPT

## 2018-02-23 PROCEDURE — 80053 COMPREHEN METABOLIC PANEL: CPT

## 2018-02-23 NOTE — ED.PDOC
General


ED Provider: 


Dr. NATTY PRIETO





Chief Complaint: Extremity Swelling/Pain


Stated Complaint: Came for the leg and thigh pain, worried about them, not 

feeling good.


Time Seen by Physician: 22:41


Mode of Arrival: Walk-In


Information Source: Patient


Primary Care Provider: 


LYNDSAY ABRAHAM





Nursing and Triage Documentation Reviewed and Agree: Yes


Reviewed sepsis parameters & appropriate labs ordered?: No


System Inflammatory Response Syndrome: Not Applicable


Sepsis Protocol: 


For patient's 13 years and over:





Temp is 96.8 and below  and greater


Pulse >90 BPM


Resp >20/minute


Acutely Altered Mental Status





Are patient's symptoms suggestive of a new infection, such as:


   -Pneumonia


   -Skin, Soft Tissue


   -Endocarditis


   -UTI


   -Bone, Joint Infection


   -Implantable Device


   -Acute Abdominal Infection


   -Wound Infection


   -Meningitis


   -Blood Stream Catheter Infection


   -Unknown








Musculoskeletal Complaint Exam





- Lower Extremity Complaint/Exam


Location of Pain: Reports: Left, Thigh


Mechanism of Injury: Reports: No known trauma


Symptoms Are: Still present


Onset of Pain: Reports: Weeks


Initial Severity: Moderate


Current Severity: Mild


Location: Reports: Diffuse


Character: Reports: Dull, Aching


Alleviating: Reports: None


Aggravating: Reports: Movement, Weight bearing


Related History: Reports: Similar episode


DVT Risk Factors: Reports: None


Septic Arthritis Risk Factors: Reports: None


Related Surgical History: Reports: None


NV Bundle Intact Distal to Injury: No


Differential Diagnoses: Strain





Review of Systems





- Review Of Systems


Constitutional: Reports: No symptoms


Eyes: Reports: No symptoms


Ears, Nose, Mouth, Throat: Reports: No symptoms


Respiratory: Reports: No symptoms


Cardiac: Reports: No symptoms


GI: Reports: No symptoms


: Reports: No symptoms


Musculoskeletal: Reports: Muscle pain, Muscle stiffness


Skin: Reports: No symptoms


Neurological: Reports: No symptoms


Endocrine: Reports: No symptoms


Hematologic/Lymphatic: Reports: No symptoms


All Other Systems: Reviewed and Negative





Past Medical History





- Past Medical History


Previously Healthy: Yes


Endocrine: Reports: None


Cardiovascular: Reports: DVT (has seen Dr Prieto- now seeing nurse in [place]- 

recommend work up for thrombophlebitis and recurrent edema), Other (DVT LEFT 

LEG )


Respiratory: Reports: None


Hematological: Reports: None


Gastrointestinal: Reports: GERD


Genitourinary: Reports: None


Neuro/Psych: Reports: None


Musculoskeletal: Reports: None


Cancer: Reports: None


Other Pertinent Past Medical History: right chest wall mass(appears to have 

symmetric adipose tissue)





- Surgical History


General Surgical History: Reports: Other (KNOT ON TAILBONE[ End ]CYST ON 

TAILBONE)





- Family History


Family History: Reports: None





- Social History


Smoking Status: Never smoker


Hx Substance Use: Yes (IN THE PAST (OPIATES))


Alcohol Screening: Occasionally





- Immunizations


Tetanus Shot up to Date: Yes





Physical Exam





- Physical Exam


Appearance: Well-appearing, No pain distress, Well-nourished


Eyes: ROSS, EOMI, Conjunctiva clear


ENT: Ears normal, Nose normal, Oropharynx normal


Respiratory: Airway patent, Breath sounds clear, Breath sounds equal, 

Respirations nonlabored


Cardiovascular: RRR, Pulses normal, No rub, No murmur


GI/: Soft, Nontender, No masses, Bowel sounds normal, No Organomegaly


Musculoskeletal: Normal strength, ROM intact, No edema, No calf tenderness


Skin: Warm, Dry, Normal color


Neurological: Sensation intact, Motor intact, Reflexes intact, Cranial nerves 

intact, Alert, Oriented


Psychiatric: Affect appropriate, Mood appropriate





Critical Care Note





- Critical Care Note


Total Time (mins): 15





Course





- Course


Hematology/Chemistry: 


 02/23/18 21:42





 02/23/18 21:42


Orders, Labs, Meds: 


Lab Review











  02/23/18 02/23/18 02/23/18





  21:42 21:42 21:54


 


WBC  10.90 H  


 


RBC  4.94  


 


Hgb  15.9  


 


Hct  46.2  


 


MCV  93.5  


 


MCH  32.2 H  


 


MCHC  34.4  


 


RDW Coeff of Keeley  13.6  


 


Plt Count  210  


 


Immature Gran % (Auto)  0.2  


 


Neut % (Auto)  57.9  


 


Lymph % (Auto)  31.4  


 


Mono % (Auto)  7.9  


 


Eos % (Auto)  2.1  


 


Baso % (Auto)  0.5  


 


Immature Gran # (Auto)  0.0  


 


Neut #  6.3  


 


Lymph #  3.4  


 


Mono #  0.9  


 


Eos #  0.2  


 


Baso #  0.1  


 


Sodium   141 


 


Potassium   4.5 


 


Chloride   103 


 


Carbon Dioxide   27 


 


Anion Gap   15.5 


 


BUN   15 


 


Creatinine   0.95 


 


Estimated GFR (MDRD)   88.00 


 


BUN/Creatinine Ratio   15.78 


 


Glucose   88 


 


Calcium   10.0 


 


Total Bilirubin   0.6 


 


AST   33 


 


ALT   21 


 


Alkaline Phosphatase   63 


 


Total Creatine Kinase   1117 


 


CK-MB (CK-2)   3.4 


 


CK-MB (CK-2) %   0.24694 


 


Troponin I   0.0160 


 


Total Protein   8.1 


 


Albumin   4.2 


 


Globulin   3.9 


 


Albumin/Globulin Ratio   1.08 


 


Urine Color   


 


Urine Clarity   


 


Urine pH   


 


Ur Specific Gravity   


 


Urine Protein   


 


Urine Glucose (UA)   


 


Urine Ketones   


 


Urine Blood   


 


Urine Nitrite   


 


Urine Bilirubin   


 


Urine Urobilinogen   


 


Ur Leukocyte Esterase   


 


Urine Microscopic RBC   


 


Ur Squamous Epith Cells   


 


Urine Mucus   


 


Urine Opiates Screen   


 


Ur Oxycodone Screen   


 


Urine Methadone Screen   


 


Ur Propoxyphene Screen   


 


Ur Barbiturates Screen   


 


Propoxyphene Screen   


 


U Tricyclic Antidepress   


 


Ur Phencyclidine Scrn   


 


Ur Barbituates Screen   


 


Ur Phencyclidine (PCP)   


 


Ur Amphetamine Screen   


 


U Methamphetamines Scrn   


 


U Benzodiazepines Scrn   


 


Urine Cocaine Screen   


 


U Cannabinoids Screen   


 


Plasma/Serum Alcohol    < 10.0














  02/23/18 02/23/18 02/23/18





  22:20 22:20 22:20


 


WBC   


 


RBC   


 


Hgb   


 


Hct   


 


MCV   


 


MCH   


 


MCHC   


 


RDW Coeff of Keeley   


 


Plt Count   


 


Immature Gran % (Auto)   


 


Neut % (Auto)   


 


Lymph % (Auto)   


 


Mono % (Auto)   


 


Eos % (Auto)   


 


Baso % (Auto)   


 


Immature Gran # (Auto)   


 


Neut #   


 


Lymph #   


 


Mono #   


 


Eos #   


 


Baso #   


 


Sodium   


 


Potassium   


 


Chloride   


 


Carbon Dioxide   


 


Anion Gap   


 


BUN   


 


Creatinine   


 


Estimated GFR (MDRD)   


 


BUN/Creatinine Ratio   


 


Glucose   


 


Calcium   


 


Total Bilirubin   


 


AST   


 


ALT   


 


Alkaline Phosphatase   


 


Total Creatine Kinase   


 


CK-MB (CK-2)   


 


CK-MB (CK-2) %   


 


Troponin I   


 


Total Protein   


 


Albumin   


 


Globulin   


 


Albumin/Globulin Ratio   


 


Urine Color   Yellow 


 


Urine Clarity   Clear 


 


Urine pH   6.5 


 


Ur Specific Gravity   1.025 


 


Urine Protein   1+ 


 


Urine Glucose (UA)   Negative 


 


Urine Ketones   Negative 


 


Urine Blood   1+ 


 


Urine Nitrite   Negative 


 


Urine Bilirubin   Negative 


 


Urine Urobilinogen   1.0 


 


Ur Leukocyte Esterase   Negative 


 


Urine Microscopic RBC   0-2 


 


Ur Squamous Epith Cells   Not present 


 


Urine Mucus   2+ 


 


Urine Opiates Screen  Negative   Negative


 


Ur Oxycodone Screen  Negative  


 


Urine Methadone Screen  Negative   Negative


 


Ur Propoxyphene Screen  Negative  


 


Ur Barbiturates Screen  Negative  


 


Propoxyphene Screen    Negative


 


U Tricyclic Antidepress  Negative  


 


Ur Phencyclidine Scrn  Negative  


 


Ur Barbituates Screen    Negative


 


Ur Phencyclidine (PCP)    Negative


 


Ur Amphetamine Screen  Negative   Negative


 


U Methamphetamines Scrn  Negative  


 


U Benzodiazepines Scrn  Positive   Positive H


 


Urine Cocaine Screen  Negative   Negative


 


U Cannabinoids Screen  Negative   Negative


 


Plasma/Serum Alcohol   








Orders











 Category Date Time Status


 


 EKG-(ED ONLY) Stat CARDIO  02/23/18 21:31 Completed


 


 BLOOD ALCOHOL Stat LAB  02/23/18 21:54 Completed


 


 CBC W/ AUTO DIFF Stat LAB  02/23/18 21:42 Completed


 


 COMPREHENSIVE METABOLIC PANEL Stat LAB  02/23/18 21:42 Completed


 


 CREATINE KINASE Stat LAB  02/23/18 21:42 Completed


 


 DRUG PROFILE, UR, 9 DRUGS Stat LAB  02/23/18 22:20 Completed


 


 TROPONIN I Stat LAB  02/23/18 21:42 Completed


 


 URINALYSIS C & S IF INDICATED Stat LAB  02/23/18 22:20 Completed


 


 URINE DRUG SCREEN (RAPID FOR ED) [DRUG SCREEN, URINE, LAB  02/23/18 22:20 

Completed





 RAPID] Stat   


 


 CT HEAD W/O CONTRAST Stat RADS  02/23/18 21:31 Completed











Vital Signs: 


 











  Temp Pulse Resp BP Pulse Ox


 


 02/23/18 21:03  99 F  88  20  118/84  96














Departure





- Departure


Time of Disposition: 22:43


Disposition: HOME SELF-CARE


Discharge Problem: 


 Edema of lower extremity





Instructions:  Leg Pain (ED)


Condition: Stable


Pt referred to PMD for follow-up: No


IPMP verified?: No


Additional Instructions: 


Keep f/u with PMD


Rest


Increase Hydration





Allergies/Adverse Reactions: 


Allergies





No Known Allergies Allergy (Verified 02/23/18 21:14)


 








Home Medications: 


Ambulatory Orders





Buprenorphine HCl/Naloxone HCl [Suboxone 8 mg-2 mg Sl Film] 1 each SL TID 07/22/ 17 








Disposition Discussed With: Patient, Family

## 2018-02-23 NOTE — CT
EXAM:  CT scan brain without contrast 

  

HISTORY:  Weakness 

  

COMPARISON:  CT scan brain 10/19/2016 

  

FINDINGS:  Contiguous axial images obtained from the skull base to the convexities without contrast u
tilizing 5-mm collimation.  Sagittal and coronal reconstructions were imaged and reviewed..  The vent
ricles and CSF spaces are within normal limits.  There are no acute intracranial findings.  The visua
lized paranasal sinuses and mastoid air cells are clear 

  

IMPRESSION: 

  

No acute intracranial findings

## 2018-04-18 ENCOUNTER — HOSPITAL ENCOUNTER (EMERGENCY)
Dept: HOSPITAL 58 - ED | Age: 38
Discharge: HOME | End: 2018-04-18

## 2018-04-18 VITALS — DIASTOLIC BLOOD PRESSURE: 79 MMHG | SYSTOLIC BLOOD PRESSURE: 120 MMHG | TEMPERATURE: 99.3 F

## 2018-04-18 VITALS — BODY MASS INDEX: 39.9 KG/M2
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
## 2018-04-18 DIAGNOSIS — S51.011A: Primary | ICD-10-CM

## 2018-04-18 DIAGNOSIS — M54.9: ICD-10-CM

## 2018-04-18 DIAGNOSIS — S50.311A: ICD-10-CM

## 2018-04-18 DIAGNOSIS — M54.2: ICD-10-CM

## 2018-04-18 PROCEDURE — 90714 TD VACC NO PRESV 7 YRS+ IM: CPT

## 2018-04-18 PROCEDURE — 90471 IMMUNIZATION ADMIN: CPT

## 2018-04-18 PROCEDURE — 96372 THER/PROPH/DIAG INJ SC/IM: CPT

## 2018-04-18 PROCEDURE — 99283 EMERGENCY DEPT VISIT LOW MDM: CPT

## 2018-04-18 NOTE — ED.PDOC
General


ED Provider: 


Dr. DAVID LAGOS





Chief Complaint: Fall


Stated Complaint: fall


Time Seen by Physician: 12:50 (seen with dania in the room)


Mode of Arrival: Walk-In


Information Source: Patient


Exam Limitations: No limitations


Primary Care Provider: 


LYNDSAY ABRAHAM





Nursing and Triage Documentation Reviewed and Agree: Yes


Reviewed sepsis parameters & appropriate labs ordered?: Yes


System Inflammatory Response Syndrome: Not Applicable


Sepsis Protocol: 


For patient's 13 years and over:





Temp is 96.8 and below  and greater


Pulse >90 BPM


Resp >20/minute


Acutely Altered Mental Status





Are patient's symptoms suggestive of a new infection, such as:


   -Pneumonia


   -Skin, Soft Tissue


   -Endocarditis


   -UTI


   -Bone, Joint Infection


   -Implantable Device


   -Acute Abdominal Infection


   -Wound Infection


   -Meningitis


   -Blood Stream Catheter Infection


   -Unknown





System Inflammatory Response Syndrome: Not Applicable





Trauma/Injury Complaint Exam





- Trauma Complaint/Exam


Location of Pain or Injury: Reports: Neck, RUE (elbow), Back (fall from 

standing position )


Mechanism of Injury: Reports: Fall


Onset/Duration: 30 min ago  fell  tripped lost balance and fell


Symptoms Are: Still present


Timing of Treatment: Immediate


Initial Severity: Mild


Current Severity: Mild


Character: Reports: Aching


Aggravating: Reports: Movement


Alleviating: Reports: Rest


Associated Signs and Symptoms: Denies: LOC, Confusion, Memory loss, Lethargy, 

Vomiting, Bleeding, Bruising, Swelling, Extremity disuse, Painful respiration, 

Hoarseness, Dysphagia, Hemoptysis, Significant blood loss


Related Surgical History: Reports: None


Nexus Low Risk Criteria: No evidence of intoxicat., No Altered LOC, No focal 

neuro deficit, No distracting injuries


Immobilization Removed Post Exam: No (pt is refusing imaging dania present)


Glascow Coma Scale (see protocol): 15


Trauma Findings: Present: Neck tenderness, Back tenderness (discussed imaging 

but pt declined  stated it does not hurt to that extent).  Absent: [place] eyes, 

Hemotympanum, Nasal deformity, Dental tenderness


Skin Findings: Present: Abrasion (laceration right elbow )


Differential Diagnoses: Abrasion, Laceration, Sprain, Strain





Review of Systems





- Review Of Systems


Constitutional: Reports: No symptoms


Eyes: Reports: No symptoms


Ears, Nose, Mouth, Throat: Reports: No symptoms


Respiratory: Reports: No symptoms


Cardiac: Reports: No symptoms


GI: Reports: No symptoms


: Reports: No symptoms


Musculoskeletal: Reports: Back pain, Neck pain


Skin: Reports: Other (laceration)


Neurological: Reports: No symptoms


Endocrine: Reports: No symptoms


Hematologic/Lymphatic: Reports: No symptoms


All Other Systems: Reviewed and Negative





Past Medical History





- Past Medical History


Previously Healthy: Yes


Endocrine: Reports: None


Cardiovascular: Reports: DVT (has seen Dr Prieto- now seeing nurse in [place]- 

recommend work up for thrombophlebitis and recurrent edema), Other (DVT LEFT 

LEG )


Respiratory: Reports: None


Hematological: Reports: None


Gastrointestinal: Reports: GERD


Genitourinary: Reports: None


Neuro/Psych: Reports: None


Musculoskeletal: Reports: None


Cancer: Reports: None


Other Pertinent Past Medical History: right chest wall mass(appears to have 

symmetric adipose tissue)





- Surgical History


General Surgical History: Reports: Other (KNOT ON TAILBONE[ End ]CYST ON 

TAILBONE)





- Family History


Family History: Reports: None





- Social History


Smoking Status: Never smoker


Hx Substance Use: Yes


Alcohol Screening: None





- Immunizations


Tetanus Shot up to Date: No





Physical Exam





- Physical Exam


Appearance: Well-appearing, No pain distress, Well-nourished


Eyes: ROSS, EOMI, Conjunctiva clear


ENT: Ears normal, Nose normal, Oropharynx normal


Respiratory: Airway patent, Breath sounds clear, Breath sounds equal, 

Respirations nonlabored


Cardiovascular: RRR, Pulses normal, No rub, No murmur


GI/: Soft, Nontender, No masses, Bowel sounds normal, No Organomegaly


Musculoskeletal: Normal strength (multiple puncture wound right eblow/forearm 

see photos)


Skin: Warm, Dry, Normal color


Neurological: Sensation intact, Motor intact, Reflexes intact, Cranial nerves 

intact, Alert, Oriented


Psychiatric: Affect appropriate, Mood appropriate





Critical Care Note





- Critical Care Note


Total Time (mins): 0





Course





- Course


Vital Signs: 





 











  Temp Pulse Resp BP Pulse Ox


 


 04/18/18 12:41  99.3 F  83  16  120/79  93 L














Departure





- Departure


Time of Disposition: 13:01 (declined imaging risk discussed pt understood and 

declined stating that pain is minimal)


Disposition: HOME SELF-CARE


Discharge Problem: 


 Falls, Laceration - injury





Abrasion of right elbow


Qualifiers:


 Encounter type: initial encounter Qualified Code(s): S50.311A - Abrasion of 

right elbow, initial encounter





Instructions:  Abrasion (ED), Laceration (ED)


Condition: Good


Pt referred to PMD for follow-up: Yes


IPMP verified?: Yes


Allergies/Adverse Reactions: 


Allergies





No Known Allergies Allergy (Verified 04/18/18 12:39)


 








Home Medications: 


Ambulatory Orders





Buprenorphine HCl/Naloxone HCl [Suboxone 8 mg-2 mg Sl Film] 1 each SL TID 07/22/ 17

## 2018-05-13 ENCOUNTER — HOSPITAL ENCOUNTER (EMERGENCY)
Facility: HOSPITAL | Age: 38
Discharge: HOME | End: 2018-05-13
Attending: EMERGENCY MEDICINE | Admitting: EMERGENCY MEDICINE

## 2018-05-13 VITALS
HEIGHT: 66 IN | TEMPERATURE: 98.3 F | RESPIRATION RATE: 18 BRPM | SYSTOLIC BLOOD PRESSURE: 147 MMHG | WEIGHT: 274.6 LBS | HEART RATE: 80 BPM | OXYGEN SATURATION: 99 % | DIASTOLIC BLOOD PRESSURE: 96 MMHG | BODY MASS INDEX: 44.13 KG/M2

## 2018-05-13 DIAGNOSIS — F19.10 DRUG ABUSE (HCC): Primary | ICD-10-CM

## 2018-05-13 LAB
ALBUMIN SERPL-MCNC: 4.5 G/DL (ref 3.5–5)
ALBUMIN/GLOB SERPL: 1.3 G/DL (ref 1.1–2.5)
ALP SERPL-CCNC: 65 U/L (ref 24–120)
ALT SERPL W P-5'-P-CCNC: 28 U/L (ref 0–54)
AMPHET+METHAMPHET UR QL: POSITIVE
ANION GAP SERPL CALCULATED.3IONS-SCNC: 11 MMOL/L (ref 4–13)
AST SERPL-CCNC: 44 U/L (ref 7–45)
BARBITURATES UR QL SCN: NEGATIVE
BASOPHILS # BLD AUTO: 0.07 10*3/MM3 (ref 0–0.2)
BASOPHILS NFR BLD AUTO: 0.7 % (ref 0–2)
BENZODIAZ UR QL SCN: POSITIVE
BILIRUB SERPL-MCNC: 0.9 MG/DL (ref 0.1–1)
BUN BLD-MCNC: 14 MG/DL (ref 5–21)
BUN/CREAT SERPL: 13.2 (ref 7–25)
CALCIUM SPEC-SCNC: 9.5 MG/DL (ref 8.4–10.4)
CANNABINOIDS SERPL QL: NEGATIVE
CHLORIDE SERPL-SCNC: 104 MMOL/L (ref 98–110)
CO2 SERPL-SCNC: 30 MMOL/L (ref 24–31)
COCAINE UR QL: NEGATIVE
CREAT BLD-MCNC: 1.06 MG/DL (ref 0.5–1.4)
DEPRECATED RDW RBC AUTO: 45.3 FL (ref 40–54)
EOSINOPHIL # BLD AUTO: 0.18 10*3/MM3 (ref 0–0.7)
EOSINOPHIL NFR BLD AUTO: 1.9 % (ref 0–4)
ERYTHROCYTE [DISTWIDTH] IN BLOOD BY AUTOMATED COUNT: 13.5 % (ref 12–15)
ETHANOL UR QL: <0.01 %
GFR SERPL CREATININE-BSD FRML MDRD: 77 ML/MIN/1.73
GLOBULIN UR ELPH-MCNC: 3.5 GM/DL
GLUCOSE BLD-MCNC: 93 MG/DL (ref 70–100)
HCT VFR BLD AUTO: 42.9 % (ref 40–52)
HGB BLD-MCNC: 14.7 G/DL (ref 14–18)
HOLD SPECIMEN: NORMAL
HOLD SPECIMEN: NORMAL
IMM GRANULOCYTES # BLD: 0.03 10*3/MM3 (ref 0–0.03)
IMM GRANULOCYTES NFR BLD: 0.3 % (ref 0–5)
LYMPHOCYTES # BLD AUTO: 2.44 10*3/MM3 (ref 0.72–4.86)
LYMPHOCYTES NFR BLD AUTO: 25.4 % (ref 15–45)
MCH RBC QN AUTO: 31.3 PG (ref 28–32)
MCHC RBC AUTO-ENTMCNC: 34.3 G/DL (ref 33–36)
MCV RBC AUTO: 91.5 FL (ref 82–95)
METHADONE UR QL SCN: NEGATIVE
MONOCYTES # BLD AUTO: 0.8 10*3/MM3 (ref 0.19–1.3)
MONOCYTES NFR BLD AUTO: 8.3 % (ref 4–12)
NEUTROPHILS # BLD AUTO: 6.08 10*3/MM3 (ref 1.87–8.4)
NEUTROPHILS NFR BLD AUTO: 63.4 % (ref 39–78)
NRBC BLD MANUAL-RTO: 0 /100 WBC (ref 0–0)
OPIATES UR QL: NEGATIVE
PCP UR QL SCN: NEGATIVE
PLATELET # BLD AUTO: 245 10*3/MM3 (ref 130–400)
PMV BLD AUTO: 9.3 FL (ref 6–12)
POTASSIUM BLD-SCNC: 4.1 MMOL/L (ref 3.5–5.3)
PROT SERPL-MCNC: 8 G/DL (ref 6.3–8.7)
RBC # BLD AUTO: 4.69 10*6/MM3 (ref 4.8–5.9)
SODIUM BLD-SCNC: 145 MMOL/L (ref 135–145)
WBC NRBC COR # BLD: 9.6 10*3/MM3 (ref 4.8–10.8)
WHOLE BLOOD HOLD SPECIMEN: NORMAL
WHOLE BLOOD HOLD SPECIMEN: NORMAL

## 2018-05-13 PROCEDURE — 80053 COMPREHEN METABOLIC PANEL: CPT | Performed by: EMERGENCY MEDICINE

## 2018-05-13 PROCEDURE — 99284 EMERGENCY DEPT VISIT MOD MDM: CPT

## 2018-05-13 PROCEDURE — 80307 DRUG TEST PRSMV CHEM ANLYZR: CPT | Performed by: EMERGENCY MEDICINE

## 2018-05-13 PROCEDURE — 96361 HYDRATE IV INFUSION ADD-ON: CPT

## 2018-05-13 PROCEDURE — 85025 COMPLETE CBC W/AUTO DIFF WBC: CPT | Performed by: EMERGENCY MEDICINE

## 2018-05-13 PROCEDURE — 96360 HYDRATION IV INFUSION INIT: CPT

## 2018-05-13 RX ORDER — SODIUM CHLORIDE 9 MG/ML
125 INJECTION, SOLUTION INTRAVENOUS CONTINUOUS
Status: DISCONTINUED | OUTPATIENT
Start: 2018-05-13 | End: 2018-05-13 | Stop reason: HOSPADM

## 2018-05-13 RX ADMIN — SODIUM CHLORIDE 125 ML/HR: 9 INJECTION, SOLUTION INTRAVENOUS at 11:09

## 2018-05-13 NOTE — ED PROVIDER NOTES
Subjective   Patient was found asleep in his car by police.  They called EMS to have him checked out.  The patient says Suboxone and a Xanax this morning after a fight with his father and stepmother.  He takes the Xanax as needed but is not prescribed for him.  He takes the Suboxone on a regular basis to treat his narcotic addiction.  The combination made him drowsy initially pulled over to sleep because he did not want to harm anyone.  He cries Rosetta about his relationship with his father and stepmother which apparently is very poor and that he will he is not worth anything.  However he does not want to harm himself or harming anyone else but just wanted to take the edge off after having a fight with them this morning.        History provided by:  Patient   used: No    Illness   Location:  Generalized  Quality:  Lethargic  Severity:  Mild  Onset quality:  Gradual  Duration:  1 hour  Timing:  Constant  Progression:  Partially resolved  Chronicity:  New  Associated symptoms: no abdominal pain, no chest pain, no congestion, no cough, no diarrhea, no ear pain, no fatigue, no fever, no headaches, no loss of consciousness, no myalgias, no nausea, no rash, no rhinorrhea, no shortness of breath, no sore throat, no vomiting and no wheezing        Review of Systems   Constitutional: Negative.  Negative for fatigue and fever.   HENT: Negative.  Negative for congestion, ear pain, rhinorrhea and sore throat.    Respiratory: Negative.  Negative for cough, shortness of breath and wheezing.    Cardiovascular: Negative.  Negative for chest pain.   Gastrointestinal: Negative.  Negative for abdominal pain, diarrhea, nausea and vomiting.   Genitourinary: Negative.    Musculoskeletal: Negative.  Negative for myalgias.   Skin: Negative.  Negative for rash.   Neurological: Negative.  Negative for loss of consciousness and headaches.   Hematological: Negative.    Psychiatric/Behavioral: Negative.    All other  systems reviewed and are negative.      Past Medical History:   Diagnosis Date   • Chronic kidney disease     Protein in urine    • Hyperlipidemia    • Hypogonadism male    • Impotence    • Ingrown toenail    • Obesity    • Opioid dependence    • Restless leg syndrome        No Known Allergies    Past Surgical History:   Procedure Laterality Date   • ORTHOPEDIC SURGERY  2009    Knot removed from tailbone   • POLYPECTOMY  2009       Family History   Problem Relation Age of Onset   • Other Maternal Uncle    • Other Paternal Aunt    • Heart failure Maternal Grandfather        Social History     Social History   • Marital status: Single     Social History Main Topics   • Smoking status: Never Smoker   • Smokeless tobacco: Never Used   • Alcohol use No   • Drug use: No   • Sexual activity: Defer     Other Topics Concern   • Not on file           Objective   Physical Exam   Constitutional: He is oriented to person, place, and time. He appears well-developed and well-nourished.   HENT:   Head: Normocephalic and atraumatic.   Eyes: EOM are normal. Pupils are equal, round, and reactive to light.   Neck: Normal range of motion. Neck supple.   Cardiovascular: Normal rate and regular rhythm.    Pulmonary/Chest: Effort normal and breath sounds normal.   Abdominal: Soft. Bowel sounds are normal.   Musculoskeletal: Normal range of motion.   Neurological: He is alert and oriented to person, place, and time. He displays normal reflexes. No cranial nerve deficit. He exhibits normal muscle tone. Coordination normal.   Patient is somewhat lethargic and has a slow speech pattern consistent with some type of intoxication.  He has no focal weaknesses and moves all of any sequelae.   Skin: Skin is warm and dry.   Psychiatric: His behavior is normal.   Teary-eyed but not suicidal or homicidal.   Nursing note and vitals reviewed.      Procedures           ED Course  ED Course   Comment By Time   Patient is asleep and no distress. Yogesh OROURKE  Tyrese Diaz MD 05/13 1237   Patient struck screen is positive for amphetamines and benzodiazepines.  He has been asleep in the ER and in no distress for some time now.  He is stable for discharge. Yogesh Xiong Jr., MD 05/13 1327                  MDM  Number of Diagnoses or Management Options  Drug abuse: established and worsening     Amount and/or Complexity of Data Reviewed  Clinical lab tests: ordered and reviewed    Risk of Complications, Morbidity, and/or Mortality  Presenting problems: moderate  Diagnostic procedures: moderate  Management options: moderate    Patient Progress  Patient progress: stable        Final diagnoses:   Drug abuse            Yogesh Xiong Jr., MD  05/13/18 [id number]

## 2018-06-18 ENCOUNTER — HOSPITAL ENCOUNTER (OUTPATIENT)
Dept: HOSPITAL 58 - RAD | Age: 38
Discharge: HOME | End: 2018-06-18
Attending: INTERNAL MEDICINE

## 2018-06-18 VITALS — BODY MASS INDEX: 39.9 KG/M2

## 2018-06-18 DIAGNOSIS — M25.521: Primary | ICD-10-CM

## 2018-06-18 NOTE — DI
EXAM: Four view right elbow 

  

COMPARISON: None 

  

HISTORY: Trauma and pain 

  

FINDINGS: There is a nondisplaced fracture of the coronoid process of the ulna.  There is no joint ef
fusion.  There is a radiopaque foreign body seen in the soft tissues overlying the proximal forearm. 
 No other fractures are identified. 

  

IMPRESSION: 

1.  Fracture of the coronoid process of the right ulna. 

2.  Embedded foreign body as described.

## 2018-06-29 ENCOUNTER — HOSPITAL ENCOUNTER (EMERGENCY)
Dept: HOSPITAL 58 - ED | Age: 38
Discharge: HOME | End: 2018-06-29

## 2018-06-29 VITALS — TEMPERATURE: 97.4 F | DIASTOLIC BLOOD PRESSURE: 68 MMHG | SYSTOLIC BLOOD PRESSURE: 152 MMHG

## 2018-06-29 VITALS — BODY MASS INDEX: 40.6 KG/M2

## 2018-06-29 DIAGNOSIS — L03.113: Primary | ICD-10-CM

## 2018-06-29 DIAGNOSIS — Z86.718: ICD-10-CM

## 2018-06-29 PROCEDURE — 99283 EMERGENCY DEPT VISIT LOW MDM: CPT

## 2018-06-29 PROCEDURE — 96372 THER/PROPH/DIAG INJ SC/IM: CPT

## 2018-06-29 NOTE — CT
EXAM:  CT right hand without contrast 

  

HISTORY:  Pain 

  

COMPARISON:  None 

  

TECHNIQUE:  CT right hand performed without intravenous contrast.  Coronal and sagittal reformatted i
mages obtained. 

  

FINDINGS:  The bone mineralization is normal.  No fracture or dislocation. No cortical destruction to
 suggest osteomyelitis. Joint spaces maintained. Subcutaneous edema about the hand, centered in the p
osterior metatarsal region second through fifth digits.  No focal drainable collection identified. 

  

IMPRESSION:  No fracture or dislocation. No CT findings of osteomyelitis. Subcutaneous edema.  Recomm
end clinical correlation for cellulitis and/or edema.  No focal drainable collection.

## 2018-06-29 NOTE — ED.PDOC
General


ED Provider: 


Dr. DAVID LAGOS





Chief Complaint: Abscess


Stated Complaint: right hand edema


Time Seen by Physician: 16:17 (seen with  nurse present at all times )


Mode of Arrival: Walk-In


Information Source: Patient


Exam Limitations: No limitations


Primary Care Provider: 


LYNDSAY ABRAHAM





Nursing and Triage Documentation Reviewed and Agree: Yes


Does patient meet sepsis criteria?: No


If yes, has appropriate treatment been initiated?: No


System Inflammatory Response Syndrome: Not Applicable


Sepsis Protocol: 


For patient's 13 years and over:





Temp is 96.8 and below  and greater


Pulse >90 BPM


Resp >20/minute


Acutely Altered Mental Status





Are patient's symptoms suggestive of a new infection, such as:


   -Pneumonia


   -Skin, Soft Tissue


   -Endocarditis


   -UTI


   -Bone, Joint Infection


   -Implantable Device


   -Acute Abdominal Infection


   -Wound Infection


   -Meningitis


   -Blood Stream Catheter Infection


   -Unknown








Musculoskeletal Complaint Exam





- Hand/Wrist Complaint/Exam


Location of Pain: Reports: Right, Hand


Mechanism of Injury: Reports: Trauma (1 week ago)


Onset/Duration: 7 days


Symptoms Are: Still present


Onset of Pain: Reports: Days


Initial Severity: Mild


Current Severity: Mild


Location: Reports: Discrete


Character: Reports: Aching


Alleviating: Reports: Rest


Aggravating: Reports: None


Associated Signs and Symptoms: Reports: Swelling.  Denies: Redness, Bruising, 

Fever, Weakness, Numbness, Tingling


Dominant Hand: Right


Hand/Wrist Findings: Present: Swelling


Differential Diagnoses: Other (cellulitis)





Review of Systems





- Review Of Systems


Constitutional: Reports: No symptoms


Eyes: Reports: No symptoms


Ears, Nose, Mouth, Throat: Reports: No symptoms


Respiratory: Reports: No symptoms


Cardiac: Reports: No symptoms


GI: Reports: No symptoms


: Reports: No symptoms


Musculoskeletal: Reports: Other (edema right hand )


Skin: Reports: No symptoms


Neurological: Reports: No symptoms


Endocrine: Reports: No symptoms


Hematologic/Lymphatic: Reports: No symptoms


All Other Systems: Reviewed and Negative





Past Medical History





- Past Medical History


Previously Healthy: Yes


Endocrine: Reports: None


Cardiovascular: Reports: DVT (has seen Dr Prieto- now seeing nurse in [place]- 

recommend work up for thrombophlebitis and recurrent edema), Other (DVT LEFT 

LEG )


Respiratory: Reports: None


Hematological: Reports: None


Gastrointestinal: Reports: GERD


Genitourinary: Reports: None


Neuro/Psych: Reports: None


Musculoskeletal: Reports: None


Cancer: Reports: None


Other Pertinent Past Medical History: right chest wall mass(appears to have 

symmetric adipose tissue)





- Surgical History


General Surgical History: Reports: Other (KNOT ON TAILBONE[ End ]CYST ON 

TAILBONE)





- Family History


Family History: Reports: None





- Social History


Smoking Status: Never smoker


Hx Substance Use: Yes


Alcohol Screening: None





- Immunizations


Tetanus Shot up to Date: Yes (2 month ago)





Physical Exam





- Physical Exam


Appearance: Well-appearing, No pain distress, Well-nourished


Eyes: ROSS, EOMI, Conjunctiva clear


ENT: Ears normal, Nose normal, Oropharynx normal


Respiratory: Airway patent, Breath sounds clear, Breath sounds equal, 

Respirations nonlabored


Cardiovascular: RRR, Pulses normal, No rub, No murmur


GI/: Soft, Nontender, No masses, Bowel sounds normal, No Organomegaly


Musculoskeletal: Normal strength, ROM intact, No edema, No calf tenderness


Skin: Warm (edema right )


Neurological: Sensation intact, Motor intact, Reflexes intact, Cranial nerves 

intact, Alert, Oriented


Psychiatric: Affect appropriate, Mood appropriate





Interpretation





- Radiology Interpretation


Radiology Interpretation By: Radiologist


Radiology Results: No acute changes





Critical Care Note





- Critical Care Note


Total Time (mins): 0





Course





- Course


Orders, Labs, Meds: 





Orders











 Category Date Time Status


 


 Ceftriaxone Sodium [Rocephin] MEDS  06/29/18 16:15 Stat





 1 gm IM ONCE STA   


 


 Lidocaine HCl/Pf [Lidocaine HCl 1% Sdv] MEDS  06/29/18 16:15 Stat





 2.1 ml IM ONCE STA   


 


 Sulfamethoxazole/Trimethoprim [Bactrim Ds 800/160 mg] MEDS  06/29/18 16:16 Stat





 1 tab PO ONCE STA   


 


 CT HAND RIGHT WITHOUT CONTRAST Stat RADS  06/29/18 16:15 Ordered








Medications














Discontinued Medications














Generic Name Dose Route Start Last Admin





  Trade Name Freq  PRN Reason Stop Dose Admin


 


Ceftriaxone Sodium  1 gm  06/29/18 16:15  06/29/18 16:37





  Rocephin  IM  06/29/18 16:16  1 gm





  ONCE STA   Administration





     





     





     





     


 


Lidocaine HCl  2.1 ml  06/29/18 16:15  06/29/18 16:37





  Lidocaine Hcl 1% Sdv  IM  06/29/18 16:16  2.1 ml





  ONCE STA   Administration





     





     





     





     


 


Trimethoprim/Sulfamethoxazole  1 tab  06/29/18 16:16  06/29/18 16:35





  Bactrim Ds 800/160 Mg  PO  06/29/18 16:17  1 tab





  ONCE STA   Administration





     





     





     





     











Vital Signs: 





 











  Temp Pulse Resp BP Pulse Ox


 


 06/29/18 16:12  97.4 F L  73  16  152/68 H  97














Departure





- Departure


Time of Disposition: 17:14


Disposition: HOME SELF-CARE


Discharge Problem: 


 Cellulitis of right hand





Instructions:  Cellulitis (ED)


Condition: Good


Pt referred to PMD for follow-up: Yes


IPMP verified?: Yes


Additional Instructions: 


Please call your Family Physician as soon as possible to schedule a follow-up 

appointment.


Allergies/Adverse Reactions: 


Allergies





No Known Allergies Allergy (Verified 06/29/18 16:21)


 








Home Medications: 


Ambulatory Orders





Buprenorphine HCl/Naloxone HCl [Suboxone 8 mg-2 mg Sl Film] 1 each SL TID 07/22/ 17

## 2018-11-02 ENCOUNTER — HOSPITAL ENCOUNTER (EMERGENCY)
Dept: HOSPITAL 58 - ED | Age: 38
Discharge: HOME | End: 2018-11-02

## 2018-11-02 VITALS — TEMPERATURE: 98.2 F | DIASTOLIC BLOOD PRESSURE: 85 MMHG | SYSTOLIC BLOOD PRESSURE: 164 MMHG

## 2018-11-02 VITALS — BODY MASS INDEX: 40.3 KG/M2

## 2018-11-02 DIAGNOSIS — S81.811A: Primary | ICD-10-CM
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
## 2018-11-02 PROCEDURE — 99283 EMERGENCY DEPT VISIT LOW MDM: CPT

## 2018-11-02 NOTE — ED.PDOC
General


ED Provider: 


Dr. DAVID LAGOS





Chief Complaint: Laceration


Stated Complaint: laceration right lower leg


Time Seen by Physician: 12:10 (see photos)


Mode of Arrival: Walk-In


Information Source: Patient


Exam Limitations: No limitations


Primary Care Provider: 


LYNDSAY ABRAHAM





Nursing and Triage Documentation Reviewed and Agree: Yes


Does patient meet sepsis criteria?: No


System Inflammatory Response Syndrome: Not Applicable


Sepsis Protocol: 


For patient's 13 years and over:





Temp is 96.8 and below  and greater


Pulse >90 BPM


Resp >20/minute


Acutely Altered Mental Status





Are patient's symptoms suggestive of a new infection, such as:


   -Pneumonia


   -Skin, Soft Tissue


   -Endocarditis


   -UTI


   -Bone, Joint Infection


   -Implantable Device


   -Acute Abdominal Infection


   -Wound Infection


   -Meningitis


   -Blood Stream Catheter Infection


   -Unknown








Skin Complaint Exam





- Laceration/Lower Ext. Complaint/Exam


Location of Injury: Right, Leg


Mechanism of Injury: Laceration


Onset/Duration: 1 hr 


Symptoms Are: Still present


Initial Severity: Mild


Current Severity: Mild


Aggravating: None


Alleviating: None


Associated Signs and Symptoms: Denies: Fever, Chills, Erythema, Numbness, 

Tingling


Differential Diagnoses: Laceration (see photos)





Review of Systems





- Review Of Systems


Constitutional: Reports: No symptoms


Eyes: Reports: No symptoms


Ears, Nose, Mouth, Throat: Reports: No symptoms


Respiratory: Reports: No symptoms


Cardiac: Reports: No symptoms


GI: Reports: No symptoms


: Reports: No symptoms


Musculoskeletal: Reports: No symptoms


Skin: Reports: Other (laceration right lower leg see photos)


Neurological: Reports: No symptoms


Endocrine: Reports: No symptoms


Hematologic/Lymphatic: Reports: No symptoms


All Other Systems: Reviewed and Negative





Past Medical History





- Past Medical History


Previously Healthy: Yes


Endocrine: Reports: None


Cardiovascular: Reports: DVT (has seen Dr Prieto- now seeing nurse in [place]- 

recommend work up for thrombophlebitis and recurrent edema), Other (DVT LEFT 

LEG )


Respiratory: Reports: None


Hematological: Reports: None


Gastrointestinal: Reports: GERD


Genitourinary: Reports: None


Neuro/Psych: Reports: None


Musculoskeletal: Reports: None


Cancer: Reports: None


Other Pertinent Past Medical History: right chest wall mass(appears to have 

symmetric adipose tissue)





- Surgical History


General Surgical History: Reports: Other (KNOT ON TAILBONE[ End ]CYST ON 

TAILBONE)





- Family History


Family History: Reports: None





- Social History


Smoking Status: Never smoker


Hx Substance Use: Yes


Alcohol Screening: None





- Immunizations


Tetanus Shot up to Date: Yes





Physical Exam





- Physical Exam


Appearance: Well-appearing, No pain distress, Well-nourished


Eyes: ROSS, EOMI, Conjunctiva clear


ENT: Ears normal, Nose normal, Oropharynx normal


Respiratory: Airway patent, Breath sounds clear, Breath sounds equal, 

Respirations nonlabored


Cardiovascular: RRR, Pulses normal, No rub, No murmur


GI/: Soft, Nontender, No masses, Bowel sounds normal, No Organomegaly


Musculoskeletal: Normal strength, ROM intact, No edema, No calf tenderness


Skin: Warm, Dry (3cm lower leg lac right side )


Neurological: Sensation intact, Motor intact, Reflexes intact, Cranial nerves 

intact, Alert, Oriented


Psychiatric: Affect appropriate, Mood appropriate





Procedures





- Laceration/Wound Repair


  ** No standard instances


Wound Description: Linear


Wound Length (cm): 3


Wound Width: 3mm


Wound Depth: 2mm


Wound Explored: Clean


Wound Irrigated: No


Wound Prep: Hibiclens


Anesthesia: Lidocaine (5ml)


Undermining: Minimal


Wound Margins: Vermilion border aligned


Wound Repaired With: Sutures


Suture Size and Type: 3 prolene


Number of Sutures: 9


Number of Staples: 0


Layer Closure?: No





Critical Care Note





- Critical Care Note


Total Time (mins): 0





Course





- Course


Orders, Labs, Meds: 





Orders











 Category Date Time Status


 


 Lidocaine HCl/Pf [Lidocaine HCl 1% Sdv] MEDS  11/02/18 12:34 Discontinued





 5 ml .ROUTE .STK-MED ONE   


 


 Lidocaine HCl/Pf [Lidocaine HCl 1% Sdv] MEDS  11/02/18 12:32 Stat





 5 ml SUBCUT ONCE STA   








Medications














Discontinued Medications














Generic Name Dose Route Start Last Admin





  Trade Name Freq  PRN Reason Stop Dose Admin


 


Lidocaine HCl  5 ml  11/02/18 12:32  





  Lidocaine Hcl 1% Sdv  SUBCUT  11/02/18 12:33  





  ONCE STA   





     





     





     





     











Vital Signs: 





 











  Temp Pulse Resp BP Pulse Ox


 


 11/02/18 12:07  98.2 F  74  20  164/85 H  94 L














Departure





- Departure


Time of Disposition: 12:57


Disposition: HOME SELF-CARE


Discharge Problem: 


 Laceration - injury, Laceration





Instructions:  Laceration (ED), Care For Your Stitches (DC), Care For Your 

Stitches (ED)


Condition: Good


Pt referred to PMD for follow-up: Yes


IPMP verified?: No


Allergies/Adverse Reactions: 


Allergies





No Known Allergies Allergy (Verified 11/02/18 12:12)


 








Home Medications: 


Ambulatory Orders





1 [No Reported Medications]  11/02/18 








Disposition Discussed With: Patient, Family

## 2019-01-16 ENCOUNTER — HOSPITAL ENCOUNTER (OUTPATIENT)
Dept: HOSPITAL 58 - RAD | Age: 39
Discharge: HOME | End: 2019-01-16
Attending: PHYSICIAN ASSISTANT

## 2019-01-16 VITALS — BODY MASS INDEX: 41.6 KG/M2

## 2019-01-16 DIAGNOSIS — R60.0: Primary | ICD-10-CM

## 2019-01-16 NOTE — US
EXAM:  Left lower extremity venous Doppler 

  

History:  Left lower extremity pain and edema. 

  

Technique:  Multiple sonographic images through the left lower extremity were obtained.  Color duplex
 Doppler was used to interrogate vascular flow. 

  

Findings:  The left common femoral, greater saphenous, profunda, superficial femoral, popliteal, shanice
florecita, posterior tibial and anterior tibial veins demonstrate spontaneous flow with normal compression
 and normal augmentation.  Incidental benign lymph node within the left groin demonstrating fatty hil
um. 

  

Impression:  No sonographic evidence for deep venous thrombosis

## 2020-05-07 ENCOUNTER — HOSPITAL ENCOUNTER (EMERGENCY)
Age: 40
Discharge: HOME | End: 2020-05-07
Attending: EMERGENCY MEDICINE
Payer: COMMERCIAL

## 2020-05-07 ENCOUNTER — APPOINTMENT (OUTPATIENT)
Dept: GENERAL RADIOLOGY | Age: 40
End: 2020-05-07
Payer: COMMERCIAL

## 2020-05-07 VITALS
TEMPERATURE: 97.8 F | OXYGEN SATURATION: 94 % | DIASTOLIC BLOOD PRESSURE: 78 MMHG | HEART RATE: 86 BPM | RESPIRATION RATE: 10 BRPM | SYSTOLIC BLOOD PRESSURE: 135 MMHG

## 2020-05-07 LAB
ALBUMIN SERPL-MCNC: 4 G/DL (ref 3.5–5.2)
ALP BLD-CCNC: 61 U/L (ref 40–130)
ALT SERPL-CCNC: 17 U/L (ref 5–41)
ANION GAP SERPL CALCULATED.3IONS-SCNC: 12 MMOL/L (ref 7–19)
AST SERPL-CCNC: 25 U/L (ref 5–40)
BASE EXCESS ARTERIAL: 6 MMOL/L (ref -2–2)
BASOPHILS ABSOLUTE: 0 K/UL (ref 0–0.2)
BASOPHILS RELATIVE PERCENT: 0.3 % (ref 0–1)
BILIRUB SERPL-MCNC: <0.2 MG/DL (ref 0.2–1.2)
BILIRUBIN URINE: NEGATIVE
BLOOD, URINE: NEGATIVE
BUN BLDV-MCNC: 11 MG/DL (ref 6–20)
C-REACTIVE PROTEIN: 0.73 MG/DL (ref 0–0.5)
CALCIUM SERPL-MCNC: 9.1 MG/DL (ref 8.6–10)
CARBOXYHEMOGLOBIN ARTERIAL: 1.7 % (ref 0–5)
CHLORIDE BLD-SCNC: 102 MMOL/L (ref 98–111)
CLARITY: CLEAR
CO2: 28 MMOL/L (ref 22–29)
COLOR: YELLOW
CREAT SERPL-MCNC: 0.9 MG/DL (ref 0.5–1.2)
D DIMER: 0.29 UG/ML FEU (ref 0–0.48)
EOSINOPHILS ABSOLUTE: 0.3 K/UL (ref 0–0.6)
EOSINOPHILS RELATIVE PERCENT: 4.8 % (ref 0–5)
GFR NON-AFRICAN AMERICAN: >60
GLUCOSE BLD-MCNC: 114 MG/DL (ref 74–109)
GLUCOSE URINE: 250 MG/DL
HBA1C MFR BLD: 5.4 % (ref 4–6)
HCO3 ARTERIAL: 32.3 MMOL/L (ref 22–26)
HCT VFR BLD CALC: 44.5 % (ref 42–52)
HEMOGLOBIN, ART, EXTENDED: 16.2 G/DL (ref 14–18)
HEMOGLOBIN: 14.9 G/DL (ref 14–18)
IMMATURE GRANULOCYTES #: 0 K/UL
KETONES, URINE: NEGATIVE MG/DL
LEUKOCYTE ESTERASE, URINE: NEGATIVE
LYMPHOCYTES ABSOLUTE: 2.1 K/UL (ref 1.1–4.5)
LYMPHOCYTES RELATIVE PERCENT: 31.9 % (ref 20–40)
MCH RBC QN AUTO: 31.4 PG (ref 27–31)
MCHC RBC AUTO-ENTMCNC: 33.5 G/DL (ref 33–37)
MCV RBC AUTO: 93.9 FL (ref 80–94)
METHEMOGLOBIN ARTERIAL: 1.3 %
MONOCYTES ABSOLUTE: 0.7 K/UL (ref 0–0.9)
MONOCYTES RELATIVE PERCENT: 10.2 % (ref 0–10)
NEUTROPHILS ABSOLUTE: 3.4 K/UL (ref 1.5–7.5)
NEUTROPHILS RELATIVE PERCENT: 52.5 % (ref 50–65)
NITRITE, URINE: NEGATIVE
O2 CONTENT ARTERIAL: 21.5 ML/DL
O2 SAT, ARTERIAL: 94.5 %
O2 THERAPY: ABNORMAL
PCO2 ARTERIAL: 51 MMHG (ref 35–45)
PDW BLD-RTO: 14 % (ref 11.5–14.5)
PH ARTERIAL: 7.41 (ref 7.35–7.45)
PH UA: 7 (ref 5–8)
PLATELET # BLD: 229 K/UL (ref 130–400)
PMV BLD AUTO: 9.2 FL (ref 9.4–12.4)
PO2 ARTERIAL: 79 MMHG (ref 80–100)
POTASSIUM REFLEX MAGNESIUM: 4.2 MMOL/L (ref 3.5–5)
POTASSIUM, WHOLE BLOOD: 4.1
PRO-BNP: <5 PG/ML (ref 0–450)
PROTEIN UA: NEGATIVE MG/DL
RBC # BLD: 4.74 M/UL (ref 4.7–6.1)
SODIUM BLD-SCNC: 142 MMOL/L (ref 136–145)
SPECIFIC GRAVITY UA: 1.01 (ref 1–1.03)
TOTAL PROTEIN: 6.8 G/DL (ref 6.6–8.7)
TROPONIN: <0.01 NG/ML (ref 0–0.03)
UROBILINOGEN, URINE: 1 E.U./DL
WBC # BLD: 6.5 K/UL (ref 4.8–10.8)

## 2020-05-07 PROCEDURE — 84484 ASSAY OF TROPONIN QUANT: CPT

## 2020-05-07 PROCEDURE — 83036 HEMOGLOBIN GLYCOSYLATED A1C: CPT

## 2020-05-07 PROCEDURE — 99285 EMERGENCY DEPT VISIT HI MDM: CPT

## 2020-05-07 PROCEDURE — 36415 COLL VENOUS BLD VENIPUNCTURE: CPT

## 2020-05-07 PROCEDURE — 36600 WITHDRAWAL OF ARTERIAL BLOOD: CPT

## 2020-05-07 PROCEDURE — 82803 BLOOD GASES ANY COMBINATION: CPT

## 2020-05-07 PROCEDURE — 83880 ASSAY OF NATRIURETIC PEPTIDE: CPT

## 2020-05-07 PROCEDURE — 85379 FIBRIN DEGRADATION QUANT: CPT

## 2020-05-07 PROCEDURE — 84132 ASSAY OF SERUM POTASSIUM: CPT

## 2020-05-07 PROCEDURE — 81003 URINALYSIS AUTO W/O SCOPE: CPT

## 2020-05-07 PROCEDURE — 86140 C-REACTIVE PROTEIN: CPT

## 2020-05-07 PROCEDURE — 94640 AIRWAY INHALATION TREATMENT: CPT

## 2020-05-07 PROCEDURE — 6360000002 HC RX W HCPCS: Performed by: EMERGENCY MEDICINE

## 2020-05-07 PROCEDURE — 93005 ELECTROCARDIOGRAM TRACING: CPT | Performed by: EMERGENCY MEDICINE

## 2020-05-07 PROCEDURE — 71045 X-RAY EXAM CHEST 1 VIEW: CPT

## 2020-05-07 PROCEDURE — 80053 COMPREHEN METABOLIC PANEL: CPT

## 2020-05-07 PROCEDURE — 85025 COMPLETE CBC W/AUTO DIFF WBC: CPT

## 2020-05-07 RX ORDER — BUPRENORPHINE HYDROCHLORIDE AND NALOXONE HYDROCHLORIDE DIHYDRATE 8; 2 MG/1; MG/1
1 TABLET SUBLINGUAL 3 TIMES DAILY
Status: ON HOLD | COMMUNITY
End: 2020-09-08

## 2020-05-07 RX ORDER — ALPRAZOLAM 1 MG/1
1 TABLET ORAL 3 TIMES DAILY PRN
COMMUNITY

## 2020-05-07 RX ORDER — ALBUTEROL SULFATE 90 UG/1
2 AEROSOL, METERED RESPIRATORY (INHALATION) EVERY 6 HOURS PRN
Qty: 1 INHALER | Refills: 0 | Status: SHIPPED | OUTPATIENT
Start: 2020-05-07

## 2020-05-07 RX ORDER — DEXTROAMPHETAMINE SACCHARATE, AMPHETAMINE ASPARTATE, DEXTROAMPHETAMINE SULFATE AND AMPHETAMINE SULFATE 7.5; 7.5; 7.5; 7.5 MG/1; MG/1; MG/1; MG/1
30 TABLET ORAL 2 TIMES DAILY
COMMUNITY

## 2020-05-07 RX ADMIN — ALBUTEROL SULFATE 2.5 MG: 2.5 SOLUTION RESPIRATORY (INHALATION) at 12:59

## 2020-05-07 ASSESSMENT — ENCOUNTER SYMPTOMS
APNEA: 0
CONSTIPATION: 0
BLOOD IN STOOL: 0
FACIAL SWELLING: 0
SHORTNESS OF BREATH: 1
VOICE CHANGE: 0
EYE DISCHARGE: 0
NAUSEA: 0
WHEEZING: 1
SINUS PRESSURE: 0
SORE THROAT: 0
CHOKING: 0
DIARRHEA: 0
ABDOMINAL PAIN: 0

## 2020-05-07 NOTE — ED PROVIDER NOTES
Mood and Affect: Mood normal.         Behavior: Behavior normal.         DIAGNOSTIC RESULTS     EKG: All EKG's are interpreted by the Emergency Department Physician who either signs or Co-signs this chart in the absence of a cardiologist.    Sinus rhythm. RADIOLOGY:   Non-plain film images such as CT, Ultrasound and MRI are read by the radiologist. Plainradiographic images are visualized and preliminarily interpreted by the emergency physician with the below findings:    I have reviewed the results of the x-ray. Interpretation per the Radiologist below, if available at the time of this note:    XR CHEST PORTABLE   Final Result   The poor lung expansion and coarse interstitial changes in   the lungs bilaterally which may represent an inflammatory process or   pulmonary vascular congestion. A follow-up examination with deeper   inspiration may be obtained.    Signed by Dr Rosanne Bonilla on 5/7/2020 12:44 PM            ED BEDSIDE ULTRASOUND:   Performed by ED Physician - none    LABS:  Labs Reviewed   CBC WITH AUTO DIFFERENTIAL - Abnormal; Notable for the following components:       Result Value    MCH 31.4 (*)     MPV 9.2 (*)     Monocytes % 10.2 (*)     All other components within normal limits   COMPREHENSIVE METABOLIC PANEL W/ REFLEX TO MG FOR LOW K - Abnormal; Notable for the following components:    Glucose 114 (*)     All other components within normal limits   URINE RT REFLEX TO CULTURE - Abnormal; Notable for the following components:    Glucose, Ur 250 (*)     All other components within normal limits   BLOOD GAS, ARTERIAL - Abnormal; Notable for the following components:    pCO2, Arterial 51.0 (*)     pO2, Arterial 79.0 (*)     HCO3, Arterial 32.3 (*)     Base Excess, Arterial 6.0 (*)     All other components within normal limits   C-REACTIVE PROTEIN - Abnormal; Notable for the following components:    CRP 0.73 (*)     All other components within normal limits   TROPONIN   BRAIN NATRIURETIC PEPTIDE HEMOGLOBIN A1C   D-DIMER, QUANTITATIVE   POTASSIUM, WHOLE BLOOD       All other labs were within normal range or not returned as of this dictation. EMERGENCY DEPARTMENT COURSE and DIFFERENTIALDIAGNOSIS/MDM:   Vitals:    Vitals:    05/07/20 1217 05/07/20 1301   BP: 135/78    Pulse: 86    Resp: 14 10   Temp: 97.8 °F (36.6 °C)    TempSrc: Oral    SpO2: 96% 94%       MDM  Number of Diagnoses or Management Options  Dyspnea and respiratory abnormalities:   Generalized edema:   LVH (left ventricular hypertrophy): Wheezing:   Diagnosis management comments: The patient's home Lasix is kicking in and he is diuresed about 2-1/2 L here in the department. His albuterol neb also improved his wheezing and he is feeling much better. I do not have the results of the echocardiogram at this time but suggest he may go home safely and follow-up with his clinician. Possible cardiac referral.  I asked him to return if his symptoms worsened. CONSULTS:  None    PROCEDURES:  Unless otherwise notedbelow, none     Procedures    FINAL IMPRESSION     1. Dyspnea and respiratory abnormalities    2. Wheezing    3. LVH (left ventricular hypertrophy)    4.  Generalized edema          DISPOSITION/PLAN   DISPOSITION Decision To Discharge 05/07/2020 04:23:09 PM      PATIENT REFERRED TO:  @FUP@    DISCHARGE MEDICATIONS:  New Prescriptions    ALBUTEROL SULFATE HFA (VENTOLIN HFA) 108 (90 BASE) MCG/ACT INHALER    Inhale 2 puffs into the lungs every 6 hours as needed for Wheezing          (Please note that portions of this note were completed with a voice recognition program.  Efforts were made to edit the dictations butoccasionally words are mis-transcribed.)    Jen Motta MD (electronically signed)  AttendingEmergency Physician          Alejandro Paiz MD  05/07/20 1019

## 2020-05-07 NOTE — PROGRESS NOTES
Results for Ernestina Arenas (MRN [id number]) as of 5/7/2020 13:03   Ref.  Range 5/7/2020 13:01   Hemoglobin, Art, Extended Latest Ref Range: 14.0 - 18.0 g/dL 16.2   pH, Arterial Latest Ref Range: 7.350 - 7.450  7.410   pCO2, Arterial Latest Ref Range: 35.0 - 45.0 mmHg 51.0 (H)   pO2, Arterial Latest Ref Range: 80.0 - 100.0 mmHg 79.0 (L)   HCO3, Arterial Latest Ref Range: 22.0 - 26.0 mmol/L 32.3 (H)   Base Excess, Arterial Latest Ref Range: -2.0 - 2.0 mmol/L 6.0 (H)   O2 Sat, Arterial Latest Ref Range: >92 % 94.5   O2 Content, Arterial Latest Ref Range: Not Established mL/dL 21.5   Methemoglobin, Arterial Latest Ref Range: <1.5 % 1.3   Carboxyhgb, Arterial Latest Ref Range: 0.0 - 5.0 % 1.7   Room air after walking  ABG at RR, +at

## 2020-05-08 ENCOUNTER — CARE COORDINATION (OUTPATIENT)
Dept: CARE COORDINATION | Age: 40
End: 2020-05-08

## 2020-05-08 NOTE — CARE COORDINATION
Attempted to reach pt via phone call. Was notified by answering party that number in chart is not correct. No HIPPA form in chart to contact Emergency Contact/alt number. Will close encounter at this time.

## 2020-05-09 LAB
EKG P AXIS: 41 DEGREES
EKG P-R INTERVAL: 158 MS
EKG Q-T INTERVAL: 334 MS
EKG QRS DURATION: 90 MS
EKG QTC CALCULATION (BAZETT): 377 MS
EKG T AXIS: 20 DEGREES

## 2020-05-09 PROCEDURE — 93010 ELECTROCARDIOGRAM REPORT: CPT | Performed by: INTERNAL MEDICINE

## 2020-07-09 ENCOUNTER — HOSPITAL ENCOUNTER (EMERGENCY)
Age: 40
Discharge: HOME | End: 2020-07-09
Payer: COMMERCIAL

## 2020-07-09 VITALS
DIASTOLIC BLOOD PRESSURE: 84 MMHG | SYSTOLIC BLOOD PRESSURE: 143 MMHG | HEART RATE: 113 BPM | RESPIRATION RATE: 20 BRPM | TEMPERATURE: 97.4 F | OXYGEN SATURATION: 96 % | HEIGHT: 68 IN

## 2020-07-09 PROCEDURE — 99282 EMERGENCY DEPT VISIT SF MDM: CPT

## 2020-07-09 RX ORDER — POTASSIUM CHLORIDE 1.5 G/1.77G
20 POWDER, FOR SOLUTION ORAL DAILY PRN
Status: ON HOLD | COMMUNITY
End: 2020-09-07

## 2020-07-09 RX ORDER — PREDNISONE 20 MG/1
20 TABLET ORAL 2 TIMES DAILY
Qty: 10 TABLET | Refills: 0 | Status: SHIPPED | OUTPATIENT
Start: 2020-07-09 | End: 2020-07-14

## 2020-07-09 RX ORDER — SULFAMETHOXAZOLE AND TRIMETHOPRIM 800; 160 MG/1; MG/1
1 TABLET ORAL 2 TIMES DAILY
Qty: 10 TABLET | Refills: 0 | Status: SHIPPED | OUTPATIENT
Start: 2020-07-09 | End: 2020-07-14

## 2020-07-09 NOTE — ED PROVIDER NOTES
[facility]  eMERGENCY dEPARTMENT eNCOUnter      Pt Name: Luanne Cheung  MRN: [id number]  [place] 1980  Date of evaluation: 7/9/2020  Provider: PAMELA Wiseman    CHIEF COMPLAINT       Chief Complaint   Patient presents with    Insect Bite     L hand at 0730 by possible brown reclu. c/o stinging, pain, swelling to L hand         HISTORY OF PRESENT ILLNESS   (Location/Symptom, Timing/Onset,Context/Setting, Quality, Duration, Modifying Factors, Severity)  Note limiting factors. Luanne Cheung is a 41 y.o. male who presents to the emergency department after being bitten this am by a spider on his left hand when he opened the curtains. He said it feels like a bee sting. He saw the spider with a\" fiddle on his back\"  Still stinging and swelling. No abdominal pain. No vomiting. The history is provided by the patient. Animal Bite   Contact animal:  Insect  Location:  Hand  Hand injury location:  [place] of L hand  Time since incident:  7 hours  Pain details:     Quality:  Stinging  Incident location:  Home  Associated symptoms: rash        NursingNotes were reviewed. REVIEW OF SYSTEMS    (2-9 systems for level 4, 10 or more for level 5)     Review of Systems   Skin: Positive for rash. Except as noted above the remainder of the review of systems was reviewed and negative. PAST MEDICAL HISTORY     Past Medical History:   Diagnosis Date    ADHD     Anxiety     PTSD (post-traumatic stress disorder)          SURGICALHISTORY     History reviewed. No pertinent surgical history.       CURRENT MEDICATIONS       Discharge Medication List as of 7/9/2020  2:38 PM      CONTINUE these medications which have NOT CHANGED    Details   Furosemide (LASIX PO) Take by mouth dailyHistorical Med      potassium chloride (KLOR-CON) 20 MEQ packet Take 20 mEq by mouth dailyHistorical Med      buprenorphine-naloxone (SUBOXONE) 8-2 MG SUBL SL tablet Place 1 tablet under the tongue 3 times daily.Historical Med      ALPRAZolam (XANAX) 1 MG tablet Take 2 mg by mouth 3 times daily as needed for Sleep or Anxiety. Historical Med      amphetamine-dextroamphetamine (ADDERALL) 30 MG tablet Take 30 mg by mouth 2 times daily. Historical Med      albuterol sulfate HFA (VENTOLIN HFA) 108 (90 Base) MCG/ACT inhaler Inhale 2 puffs into the lungs every 6 hours as needed for Wheezing, Disp-1 Inhaler, R-0Print             ALLERGIES     Patient has no known allergies. FAMILY HISTORY     History reviewed. No pertinent family history.        SOCIAL HISTORY       Social History     Socioeconomic History    Marital status: Single     Spouse name: None    Number of children: None    Years of education: None    Highest education level: None   Occupational History    None   Social Needs    Financial resource strain: None    Food insecurity     Worry: None     Inability: None    Transportation needs     Medical: None     Non-medical: None   Tobacco Use    Smoking status: Never Smoker    Smokeless tobacco: Never Used   Substance and Sexual Activity    Alcohol use: Not Currently    Drug use: Not Currently     Comment: recovering addict    Sexual activity: None   Lifestyle    Physical activity     Days per week: None     Minutes per session: None    Stress: None   Relationships    Social connections     Talks on phone: None     Gets together: None     Attends Advent service: None     Active member of club or organization: None     Attends meetings of clubs or organizations: None     Relationship status: None    Intimate partner violence     Fear of current or ex partner: None     Emotionally abused: None     Physically abused: None     Forced sexual activity: None   Other Topics Concern    None   Social History Narrative    None       SCREENINGS      @FLOW([id number])@      PHYSICAL EXAM    (up to 7 for level 4, 8 or more for level 5)     ED Triage Vitals [07/09/20 1316]   BP Temp Temp src Pulse Resp SpO2 Height Weight   (!) 143/84 97.4 °F (36.3 °C) -- 113 20 96 % 5' 8\" (1.727 m) --       Physical Exam  Vitals signs and nursing note reviewed. Constitutional:       Appearance: He is well-developed. HENT:      Head: Normocephalic and atraumatic. Eyes:      General: No scleral icterus. Right eye: No discharge. Left eye: No discharge. Neck:      Musculoskeletal: Normal range of motion and neck supple. Cardiovascular:      Rate and Rhythm: Normal rate. Pulmonary:      Effort: No respiratory distress. Skin:         Neurological:      Mental Status: He is alert. Psychiatric:         Behavior: Behavior normal.         DIAGNOSTIC RESULTS     EKG: All EKG's are interpreted by the Emergency Department Physician who either signs or Co-signsthis chart in the absence of a cardiologist.        RADIOLOGY:   Non-plain filmimages such as CT, Ultrasound and MRI are read by the radiologist. Plain radiographic images are visualized and preliminarily interpreted by the emergency physician with the below findings:      Interpretation per the Radiologist below, if available at the time of this note:    No orders to display         ED BEDSIDEULTRASOUND:   Performed by ED Physician -none    LABS:  Labs Reviewed - No data to display    All other labs were within normal range or not returned as of this dictation. EMERGENCY DEPARTMENT COURSE and DIFFERENTIALDIAGNOSIS/MDM:   Vitals:    Vitals:    07/09/20 1316   BP: (!) 143/84   Pulse: 113   Resp: 20   Temp: 97.4 °F (36.3 °C)   SpO2: 96%   Height: 5' 8\" (1.727 m)           MDM  Pt instructed to continue to monitor and return if worsening or new symptoms      CONSULTS:  None    PROCEDURES:  Unless otherwise noted below, none     Procedures    FINAL IMPRESSION      1.  Spider bite wound, undetermined intent, initial encounter        DISPOSITION/PLAN   DISPOSITION        PATIENT REFERRED TO:  Rolanda Hong  [phone]            DISCHARGE MEDICATIONS:  Discharge Medication List as of 7/9/2020  2:38 PM      START taking these medications    Details   predniSONE (DELTASONE) 20 MG tablet Take 1 tablet by mouth 2 times daily for 5 days, Disp-10 tablet, R-0Print      sulfamethoxazole-trimethoprim (BACTRIM DS) 800-160 MG per tablet Take 1 tablet by mouth 2 times daily for 5 days, Disp-10 tablet, R-0Print                (Please note that portions of this note were completed with a voice recognitionprogram.  Efforts were made to edit the dictations but occasionally words are mis-transcribed.)    PAMELA Ramírez (electronically signed)         PAMELA Ramírez  07/09/20 4309

## 2020-09-05 ENCOUNTER — HOSPITAL ENCOUNTER (EMERGENCY)
Age: 40
Discharge: HOME | DRG: 383 | End: 2020-09-05
Attending: EMERGENCY MEDICINE
Payer: COMMERCIAL

## 2020-09-05 VITALS
HEIGHT: 70 IN | RESPIRATION RATE: 20 BRPM | DIASTOLIC BLOOD PRESSURE: 89 MMHG | WEIGHT: 307 LBS | TEMPERATURE: 99.8 F | BODY MASS INDEX: 43.95 KG/M2 | HEART RATE: 89 BPM | OXYGEN SATURATION: 96 % | SYSTOLIC BLOOD PRESSURE: 150 MMHG

## 2020-09-05 LAB
ALBUMIN SERPL-MCNC: 3.2 G/DL (ref 3.5–5.2)
ALP BLD-CCNC: 80 U/L (ref 40–130)
ALT SERPL-CCNC: 70 U/L (ref 5–41)
ANION GAP SERPL CALCULATED.3IONS-SCNC: 13 MMOL/L (ref 7–19)
APTT: 30 SEC (ref 26–36.2)
AST SERPL-CCNC: 49 U/L (ref 5–40)
BASOPHILS ABSOLUTE: 0 K/UL (ref 0–0.2)
BASOPHILS RELATIVE PERCENT: 0.4 % (ref 0–1)
BILIRUB SERPL-MCNC: 0.3 MG/DL (ref 0.2–1.2)
BUN BLDV-MCNC: 15 MG/DL (ref 6–20)
CALCIUM SERPL-MCNC: 8.9 MG/DL (ref 8.6–10)
CHLORIDE BLD-SCNC: 96 MMOL/L (ref 98–111)
CO2: 26 MMOL/L (ref 22–29)
CREAT SERPL-MCNC: 0.9 MG/DL (ref 0.5–1.2)
EOSINOPHILS ABSOLUTE: 0.2 K/UL (ref 0–0.6)
EOSINOPHILS RELATIVE PERCENT: 2.1 % (ref 0–5)
GFR AFRICAN AMERICAN: >59
GFR NON-AFRICAN AMERICAN: >60
GLUCOSE BLD-MCNC: 117 MG/DL (ref 74–109)
HCT VFR BLD CALC: 44.1 % (ref 42–52)
HEMOGLOBIN: 14.9 G/DL (ref 14–18)
IMMATURE GRANULOCYTES #: 0.1 K/UL
INR BLD: 1.01 (ref 0.88–1.18)
LYMPHOCYTES ABSOLUTE: 1 K/UL (ref 1.1–4.5)
LYMPHOCYTES RELATIVE PERCENT: 11.5 % (ref 20–40)
MCH RBC QN AUTO: 31.4 PG (ref 27–31)
MCHC RBC AUTO-ENTMCNC: 33.8 G/DL (ref 33–37)
MCV RBC AUTO: 93 FL (ref 80–94)
MONOCYTES ABSOLUTE: 0.7 K/UL (ref 0–0.9)
MONOCYTES RELATIVE PERCENT: 8.7 % (ref 0–10)
NEUTROPHILS ABSOLUTE: 6.5 K/UL (ref 1.5–7.5)
NEUTROPHILS RELATIVE PERCENT: 76.4 % (ref 50–65)
PDW BLD-RTO: 13.6 % (ref 11.5–14.5)
PLATELET # BLD: 206 K/UL (ref 130–400)
PMV BLD AUTO: 9.8 FL (ref 9.4–12.4)
POTASSIUM REFLEX MAGNESIUM: 3.9 MMOL/L (ref 3.5–5)
PROTHROMBIN TIME: 13.2 SEC (ref 12–14.6)
RBC # BLD: 4.74 M/UL (ref 4.7–6.1)
SODIUM BLD-SCNC: 135 MMOL/L (ref 136–145)
TOTAL PROTEIN: 7.3 G/DL (ref 6.6–8.7)
WBC # BLD: 8.5 K/UL (ref 4.8–10.8)

## 2020-09-05 PROCEDURE — 85610 PROTHROMBIN TIME: CPT

## 2020-09-05 PROCEDURE — 99282 EMERGENCY DEPT VISIT SF MDM: CPT

## 2020-09-05 PROCEDURE — 85730 THROMBOPLASTIN TIME PARTIAL: CPT

## 2020-09-05 PROCEDURE — 85025 COMPLETE CBC W/AUTO DIFF WBC: CPT

## 2020-09-05 PROCEDURE — 80053 COMPREHEN METABOLIC PANEL: CPT

## 2020-09-05 PROCEDURE — 36415 COLL VENOUS BLD VENIPUNCTURE: CPT

## 2020-09-05 ASSESSMENT — PAIN SCALES - GENERAL: PAINLEVEL_OUTOF10: 6

## 2020-09-05 NOTE — ED PROVIDER NOTES
into the lungs every 6 hours as needed for Wheezing    ALPRAZOLAM (XANAX) 1 MG TABLET    Take 2 mg by mouth 3 times daily as needed for Sleep or Anxiety. AMPHETAMINE-DEXTROAMPHETAMINE (ADDERALL) 30 MG TABLET    Take 30 mg by mouth 2 times daily. BUPRENORPHINE-NALOXONE (SUBOXONE) 8-2 MG SUBL SL TABLET    Place 1 tablet under the tongue 3 times daily. FUROSEMIDE (LASIX PO)    Take by mouth daily    POTASSIUM CHLORIDE (KLOR-CON) 20 MEQ PACKET    Take 20 mEq by mouth daily       ALLERGIES     Patient has no known allergies. FAMILY HISTORY     History reviewed. No pertinent family history.        SOCIAL HISTORY       Social History     Socioeconomic History    Marital status: Single     Spouse name: None    Number of children: None    Years of education: None    Highest education level: None   Occupational History    None   Social Needs    Financial resource strain: None    Food insecurity     Worry: None     Inability: None    Transportation needs     Medical: None     Non-medical: None   Tobacco Use    Smoking status: Never Smoker    Smokeless tobacco: Never Used   Substance and Sexual Activity    Alcohol use: Not Currently    Drug use: Not Currently     Comment: recovering addict    Sexual activity: None   Lifestyle    Physical activity     Days per week: None     Minutes per session: None    Stress: None   Relationships    Social connections     Talks on phone: None     Gets together: None     Attends Orthodoxy service: None     Active member of club or organization: None     Attends meetings of clubs or organizations: None     Relationship status: None    Intimate partner violence     Fear of current or ex partner: None     Emotionally abused: None     Physically abused: None     Forced sexual activity: None   Other Topics Concern    None   Social History Narrative    None       SCREENINGS      @FLOW([id number])@      PHYSICAL EXAM    (up to 7 for level 4, 8 or more for level 5)     ED Triage Vitals [09/05/20 1808]   BP Temp Temp Source Pulse Resp SpO2 Height Weight   (!) 150/89 99.8 °F (37.7 °C) Oral 89 20 96 % 5' 10\" (1.778 m) (!) 307 lb (139.3 kg)       Physical Exam  Vitals signs and nursing note reviewed. Constitutional:       General: He is not in acute distress. Appearance: Normal appearance. He is obese. He is not ill-appearing, toxic-appearing or diaphoretic. HENT:      Mouth/Throat:      Mouth: Mucous membranes are moist.      Pharynx: Oropharynx is clear. Eyes:      Conjunctiva/sclera: Conjunctivae normal.   Neck:      Musculoskeletal: Normal range of motion and neck supple. Cardiovascular:      Rate and Rhythm: Normal rate and regular rhythm. Heart sounds: Normal heart sounds. Pulmonary:      Effort: Pulmonary effort is normal.   Musculoskeletal:         General: Tenderness (Tmod left lower leg, erythema/heat left lower leg) present. Neurological:      General: No focal deficit present. Mental Status: He is alert and oriented to person, place, and time. Cranial Nerves: No cranial nerve deficit.    Psychiatric:         Mood and Affect: Mood normal.         DIAGNOSTIC RESULTS     EKG: All EKG's are interpreted by the Emergency Department Physician who either signs or Co-signsthis chart in the absence of a cardiologist.        RADIOLOGY:   Melanie Form such as CT, Ultrasound and MRI are read by the radiologist. Plain radiographic images are visualized and preliminarily interpreted by the emergency physician with the below findings:        Interpretation per the Radiologist below, if available at the time ofthis note:    No orders to display         ED BEDSIDE ULTRASOUND:   Performed by ED Physician - none    LABS:  Labs Reviewed   CBC WITH AUTO DIFFERENTIAL - Abnormal; Notable for the following components:       Result Value    MCH 31.4 (*)     Neutrophils % 76.4 (*)     Lymphocytes % 11.5 (*)     Lymphocytes Absolute 1.0 (*)     All other components within normal limits   COMPREHENSIVE METABOLIC PANEL W/ REFLEX TO MG FOR LOW K - Abnormal; Notable for the following components:    Sodium 135 (*)     Chloride 96 (*)     Glucose 117 (*)     Alb 3.2 (*)     ALT 70 (*)     AST 49 (*)     All other components within normal limits   PROTIME-INR   APTT       All other labs were within normal range or not returned as of this dictation. EMERGENCY DEPARTMENT COURSE and DIFFERENTIAL DIAGNOSIS/MDM:   Vitals:    Vitals:    09/05/20 1808   BP: (!) 150/89   Pulse: 89   Resp: 20   Temp: 99.8 °F (37.7 °C)   TempSrc: Oral   SpO2: 96%   Weight: (!) 307 lb (139.3 kg)   Height: 5' 10\" (1.778 m)           MDM  Number of Diagnoses or Management Options  Acute deep vein thrombosis (DVT) of left lower extremity, unspecified vein ([address]):   Cellulitis of left lower extremity:   Diagnosis management comments: Through discussion with pt, decided to start Eliquis. Free month coupon given. Follow up PCP. CRITICAL CARE TIME   Total Critical Care time was 0 minutes, excluding separately reportable procedures. There was a high probability of clinically significant/lifethreatening deterioration in the patient's condition which required my urgent intervention. CONSULTS:  None    PROCEDURES:  Unless otherwise noted below, none     Procedures    FINAL IMPRESSION      1. Cellulitis of left lower extremity    2. Acute deep vein thrombosis (DVT) of left lower extremity, unspecified vein ([address]          DISPOSITION/PLAN   DISPOSITION        PATIENT REFERRED TO:  Valentino Bae  [phone]    Schedule an appointment as soon as possible for a visit   Finish antibiotic for cellulitis. DISCHARGE MEDICATIONS:  New Prescriptions    APIXABAN (ELIQUIS DVT/PE STARTER PACK) 5 MG TABS TABLET    Take 10 mg (2 tablets) orally twice daily for 7 days, then take 5 mg (1 tablet) orally twice daily thereafter.           (Please note that portions of this note were completed with a voice recognition program.  Efforts were made to edit the dictations but occasionally words are mis-transcribed.)    Jacques Waters MD (electronically signed)  Attending Emergency Physician          Jacques Waters MD  09/05/20 7430

## 2020-09-07 ENCOUNTER — HOSPITAL ENCOUNTER (INPATIENT)
Age: 40
LOS: 3 days | Discharge: HOME | DRG: 383 | End: 2020-09-10
Attending: HOSPITALIST | Admitting: HOSPITALIST
Payer: COMMERCIAL

## 2020-09-07 PROBLEM — L03.90 CELLULITIS: Status: ACTIVE | Noted: 2020-09-07

## 2020-09-07 LAB
ALBUMIN SERPL-MCNC: 2.9 G/DL (ref 3.5–5.2)
ALP BLD-CCNC: 70 U/L (ref 40–130)
ALT SERPL-CCNC: 43 U/L (ref 5–41)
ANION GAP SERPL CALCULATED.3IONS-SCNC: 9 MMOL/L (ref 7–19)
AST SERPL-CCNC: 21 U/L (ref 5–40)
BILIRUB SERPL-MCNC: 0.3 MG/DL (ref 0.2–1.2)
BUN BLDV-MCNC: 13 MG/DL (ref 6–20)
CALCIUM SERPL-MCNC: 8.8 MG/DL (ref 8.6–10)
CHLORIDE BLD-SCNC: 97 MMOL/L (ref 98–111)
CO2: 30 MMOL/L (ref 22–29)
CREAT SERPL-MCNC: 0.7 MG/DL (ref 0.5–1.2)
GFR AFRICAN AMERICAN: >59
GFR NON-AFRICAN AMERICAN: >60
GLUCOSE BLD-MCNC: 111 MG/DL (ref 74–109)
HCT VFR BLD CALC: 37.8 % (ref 42–52)
HEMOGLOBIN: 12.7 G/DL (ref 14–18)
MCH RBC QN AUTO: 31.3 PG (ref 27–31)
MCHC RBC AUTO-ENTMCNC: 33.6 G/DL (ref 33–37)
MCV RBC AUTO: 93.1 FL (ref 80–94)
PDW BLD-RTO: 13.5 % (ref 11.5–14.5)
PLATELET # BLD: 216 K/UL (ref 130–400)
PMV BLD AUTO: 9.5 FL (ref 9.4–12.4)
POTASSIUM REFLEX MAGNESIUM: 3.7 MMOL/L (ref 3.5–5)
RBC # BLD: 4.06 M/UL (ref 4.7–6.1)
SODIUM BLD-SCNC: 136 MMOL/L (ref 136–145)
TOTAL PROTEIN: 6.7 G/DL (ref 6.6–8.7)
WBC # BLD: 13.2 K/UL (ref 4.8–10.8)

## 2020-09-07 PROCEDURE — 85027 COMPLETE CBC AUTOMATED: CPT

## 2020-09-07 PROCEDURE — 6370000000 HC RX 637 (ALT 250 FOR IP): Performed by: HOSPITALIST

## 2020-09-07 PROCEDURE — 1210000000 HC MED SURG R&B

## 2020-09-07 PROCEDURE — 2580000003 HC RX 258: Performed by: HOSPITALIST

## 2020-09-07 PROCEDURE — 36415 COLL VENOUS BLD VENIPUNCTURE: CPT

## 2020-09-07 PROCEDURE — 6360000002 HC RX W HCPCS: Performed by: HOSPITALIST

## 2020-09-07 PROCEDURE — 80053 COMPREHEN METABOLIC PANEL: CPT

## 2020-09-07 RX ORDER — ACETAMINOPHEN 650 MG/1
650 SUPPOSITORY RECTAL EVERY 6 HOURS PRN
Status: DISCONTINUED | OUTPATIENT
Start: 2020-09-07 | End: 2020-09-10 | Stop reason: HOSPADM

## 2020-09-07 RX ORDER — ACETAMINOPHEN 325 MG/1
650 TABLET ORAL EVERY 6 HOURS PRN
Status: DISCONTINUED | OUTPATIENT
Start: 2020-09-07 | End: 2020-09-10 | Stop reason: HOSPADM

## 2020-09-07 RX ORDER — FUROSEMIDE 40 MG/1
40 TABLET ORAL DAILY
Status: DISCONTINUED | OUTPATIENT
Start: 2020-09-07 | End: 2020-09-10 | Stop reason: HOSPADM

## 2020-09-07 RX ORDER — POTASSIUM CHLORIDE 1500 MG/1
20 TABLET, FILM COATED, EXTENDED RELEASE ORAL
COMMUNITY

## 2020-09-07 RX ORDER — POTASSIUM CHLORIDE 1.5 G/1.77G
20 POWDER, FOR SOLUTION ORAL DAILY
Status: DISCONTINUED | OUTPATIENT
Start: 2020-09-07 | End: 2020-09-07

## 2020-09-07 RX ORDER — ALPRAZOLAM 1 MG/1
2 TABLET ORAL 3 TIMES DAILY PRN
Status: DISCONTINUED | OUTPATIENT
Start: 2020-09-07 | End: 2020-09-08

## 2020-09-07 RX ORDER — SODIUM CHLORIDE 0.9 % (FLUSH) 0.9 %
10 SYRINGE (ML) INJECTION PRN
Status: DISCONTINUED | OUTPATIENT
Start: 2020-09-07 | End: 2020-09-10 | Stop reason: HOSPADM

## 2020-09-07 RX ORDER — PROMETHAZINE HYDROCHLORIDE 12.5 MG/1
12.5 TABLET ORAL EVERY 6 HOURS PRN
Status: DISCONTINUED | OUTPATIENT
Start: 2020-09-07 | End: 2020-09-10 | Stop reason: HOSPADM

## 2020-09-07 RX ORDER — ALBUTEROL SULFATE 2.5 MG/3ML
2.5 SOLUTION RESPIRATORY (INHALATION) EVERY 6 HOURS PRN
Status: DISCONTINUED | OUTPATIENT
Start: 2020-09-07 | End: 2020-09-10 | Stop reason: HOSPADM

## 2020-09-07 RX ORDER — SULFAMETHOXAZOLE AND TRIMETHOPRIM 800; 160 MG/1; MG/1
1 TABLET ORAL 2 TIMES DAILY
Status: ON HOLD | COMMUNITY
End: 2020-09-10 | Stop reason: HOSPADM

## 2020-09-07 RX ORDER — POTASSIUM CHLORIDE 20 MEQ/1
20 TABLET, EXTENDED RELEASE ORAL DAILY
Status: DISCONTINUED | OUTPATIENT
Start: 2020-09-07 | End: 2020-09-10 | Stop reason: HOSPADM

## 2020-09-07 RX ORDER — PANTOPRAZOLE SODIUM 40 MG/1
40 TABLET, DELAYED RELEASE ORAL
Status: DISCONTINUED | OUTPATIENT
Start: 2020-09-08 | End: 2020-09-10 | Stop reason: HOSPADM

## 2020-09-07 RX ORDER — BUPRENORPHINE HYDROCHLORIDE AND NALOXONE HYDROCHLORIDE DIHYDRATE 2; .5 MG/1; MG/1
4 TABLET SUBLINGUAL ONCE
Status: COMPLETED | OUTPATIENT
Start: 2020-09-07 | End: 2020-09-07

## 2020-09-07 RX ORDER — POTASSIUM CHLORIDE 20 MEQ/1
40 TABLET, EXTENDED RELEASE ORAL PRN
Status: DISCONTINUED | OUTPATIENT
Start: 2020-09-07 | End: 2020-09-10 | Stop reason: HOSPADM

## 2020-09-07 RX ORDER — OMEPRAZOLE 20 MG/1
20 CAPSULE, DELAYED RELEASE ORAL DAILY
COMMUNITY

## 2020-09-07 RX ORDER — SENNA PLUS 8.6 MG/1
1 TABLET ORAL NIGHTLY
Status: DISCONTINUED | OUTPATIENT
Start: 2020-09-07 | End: 2020-09-10 | Stop reason: HOSPADM

## 2020-09-07 RX ORDER — IBUPROFEN 800 MG/1
800 TABLET ORAL EVERY 8 HOURS PRN
COMMUNITY

## 2020-09-07 RX ORDER — MAGNESIUM SULFATE IN WATER 40 MG/ML
2 INJECTION, SOLUTION INTRAVENOUS PRN
Status: DISCONTINUED | OUTPATIENT
Start: 2020-09-07 | End: 2020-09-10 | Stop reason: HOSPADM

## 2020-09-07 RX ORDER — ONDANSETRON 2 MG/ML
4 INJECTION INTRAMUSCULAR; INTRAVENOUS EVERY 6 HOURS PRN
Status: DISCONTINUED | OUTPATIENT
Start: 2020-09-07 | End: 2020-09-10 | Stop reason: HOSPADM

## 2020-09-07 RX ORDER — POLYETHYLENE GLYCOL 3350 17 G/17G
17 POWDER, FOR SOLUTION ORAL DAILY PRN
Status: DISCONTINUED | OUTPATIENT
Start: 2020-09-07 | End: 2020-09-10 | Stop reason: HOSPADM

## 2020-09-07 RX ORDER — BUPRENORPHINE HYDROCHLORIDE AND NALOXONE HYDROCHLORIDE DIHYDRATE 8; 2 MG/1; MG/1
1 TABLET SUBLINGUAL 3 TIMES DAILY
Status: DISCONTINUED | OUTPATIENT
Start: 2020-09-07 | End: 2020-09-08

## 2020-09-07 RX ORDER — DEXTROAMPHETAMINE SACCHARATE, AMPHETAMINE ASPARTATE, DEXTROAMPHETAMINE SULFATE AND AMPHETAMINE SULFATE 2.5; 2.5; 2.5; 2.5 MG/1; MG/1; MG/1; MG/1
30 TABLET ORAL 2 TIMES DAILY
Status: DISCONTINUED | OUTPATIENT
Start: 2020-09-07 | End: 2020-09-09

## 2020-09-07 RX ORDER — SODIUM CHLORIDE 0.9 % (FLUSH) 0.9 %
10 SYRINGE (ML) INJECTION EVERY 12 HOURS SCHEDULED
Status: DISCONTINUED | OUTPATIENT
Start: 2020-09-07 | End: 2020-09-10 | Stop reason: HOSPADM

## 2020-09-07 RX ORDER — POTASSIUM CHLORIDE 7.45 MG/ML
10 INJECTION INTRAVENOUS PRN
Status: DISCONTINUED | OUTPATIENT
Start: 2020-09-07 | End: 2020-09-10 | Stop reason: HOSPADM

## 2020-09-07 RX ADMIN — SODIUM CHLORIDE, PRESERVATIVE FREE 10 ML: 5 INJECTION INTRAVENOUS at 19:57

## 2020-09-07 RX ADMIN — BUPRENORPHINE HYDROCHLORIDE AND NALOXONE HYDROCHLORIDE DIHYDRATE 4 TABLET: 2; .5 TABLET SUBLINGUAL at 19:56

## 2020-09-07 RX ADMIN — CEFAZOLIN SODIUM 1 G: 1 INJECTION, POWDER, FOR SOLUTION INTRAMUSCULAR; INTRAVENOUS at 19:22

## 2020-09-07 RX ADMIN — POTASSIUM CHLORIDE 20 MEQ: 1500 TABLET, EXTENDED RELEASE ORAL at 19:55

## 2020-09-07 RX ADMIN — SENNOSIDES 8.6 MG: 8.6 TABLET, FILM COATED ORAL at 19:56

## 2020-09-07 RX ADMIN — ALPRAZOLAM 2 MG: 1 TABLET ORAL at 20:12

## 2020-09-07 RX ADMIN — ACETAMINOPHEN 650 MG: 325 TABLET, FILM COATED ORAL at 20:12

## 2020-09-07 RX ADMIN — APIXABAN 10 MG: 5 TABLET, FILM COATED ORAL at 19:56

## 2020-09-07 RX ADMIN — DEXTROAMPHETAMINE SACCHARATE, AMPHETAMINE ASPARTATE, DEXTROAMPHETAMINE SULFATE AND AMPHETAMINE SULFATE 30 MG: 2.5; 2.5; 2.5; 2.5 TABLET ORAL at 19:56

## 2020-09-07 ASSESSMENT — PAIN DESCRIPTION - PAIN TYPE
TYPE: ACUTE PAIN

## 2020-09-07 ASSESSMENT — PAIN SCALES - GENERAL
PAINLEVEL_OUTOF10: 7
PAINLEVEL_OUTOF10: 7
PAINLEVEL_OUTOF10: 3

## 2020-09-07 ASSESSMENT — PAIN DESCRIPTION - DESCRIPTORS
DESCRIPTORS: SQUEEZING
DESCRIPTORS: HEADACHE
DESCRIPTORS: SQUEEZING

## 2020-09-07 ASSESSMENT — PAIN DESCRIPTION - LOCATION
LOCATION: HEAD
LOCATION: LEG
LOCATION: LEG

## 2020-09-07 ASSESSMENT — PAIN DESCRIPTION - ONSET
ONSET: ON-GOING

## 2020-09-07 ASSESSMENT — PAIN - FUNCTIONAL ASSESSMENT
PAIN_FUNCTIONAL_ASSESSMENT: PREVENTS OR INTERFERES SOME ACTIVE ACTIVITIES AND ADLS
PAIN_FUNCTIONAL_ASSESSMENT: PREVENTS OR INTERFERES SOME ACTIVE ACTIVITIES AND ADLS
PAIN_FUNCTIONAL_ASSESSMENT: ACTIVITIES ARE NOT PREVENTED

## 2020-09-07 ASSESSMENT — PAIN DESCRIPTION - PROGRESSION
CLINICAL_PROGRESSION: GRADUALLY WORSENING
CLINICAL_PROGRESSION: NOT CHANGED
CLINICAL_PROGRESSION: GRADUALLY WORSENING

## 2020-09-07 ASSESSMENT — PAIN DESCRIPTION - ORIENTATION
ORIENTATION: LEFT
ORIENTATION: LEFT

## 2020-09-07 ASSESSMENT — PAIN DESCRIPTION - FREQUENCY
FREQUENCY: CONTINUOUS

## 2020-09-07 NOTE — PLAN OF CARE
Problem: Falls - Risk of:  Goal: Will remain free from falls  Description: Will remain free from falls  Outcome: Ongoing  Goal: Absence of physical injury  Description: Absence of physical injury  Outcome: Ongoing     Problem: Infection:  Goal: Will remain free from infection  Description: Will remain free from infection  Outcome: Ongoing     Problem: Safety:  Goal: Free from accidental physical injury  Description: Free from accidental physical injury  Outcome: Ongoing  Goal: Free from intentional harm  Description: Free from intentional harm  Outcome: Ongoing     Problem: Daily Care:  Goal: Daily care needs are met  Description: Daily care needs are met  Outcome: Ongoing     Problem: Pain:  Goal: Patient's pain/discomfort is manageable  Description: Patient's pain/discomfort is manageable  Outcome: Ongoing     Problem: Skin Integrity:  Goal: Skin integrity will stabilize  Description: Skin integrity will stabilize  Outcome: Ongoing     Problem: Discharge Planning:  Goal: Patients continuum of care needs are met  Description: Patients continuum of care needs are met  Outcome: Ongoing     Problem: Skin Integrity:  Goal: Will show no infection signs and symptoms  Description: Will show no infection signs and symptoms  Outcome: Ongoing  Goal: Absence of new skin breakdown  Description: Absence of new skin breakdown  Outcome: Ongoing     Problem: Mobility - Impaired:  Goal: Mobility will improve  Description: Mobility will improve  Outcome: Ongoing

## 2020-09-07 NOTE — PROGRESS NOTES
4 Eyes Skin Assessment    Anne-Marie Scales is being assessed upon: Admission    I agree that I, Ze Brambila, along with Bia Chauhan (either 2 RN's or 1 LPN and 1 RN) have performed a thorough Head to Toe Skin Assessment on the patient. ALL assessment sites listed below have been assessed. Areas assessed by both nurses:     [x]   Head, Face, and Ears   [x]   Shoulders, Back, and Chest  [x]   Arms, Elbows, and Hands   [x]   Coccyx, Sacrum, and Ischium  [x]   Legs, Feet, and Heels    Does the Patient have Skin Breakdown? Yes, wound(s) noted upon assessment. It is the responsibility of the Primary Nurse to assure that the following documentation, preventions, orders, and consults are complete on the above noted wound(s): Wound LDA initiated. LDA Flowsheet Documentation includes the Brenda-wound, Wound Assessment, Measurements, Dressing Treatment, Drainage, and Color.     Israel Prevention initiated: Yes  Wound Care Orders initiated: Yes    [id number]  nurse consulted for Pressure Injury (Stage 3,4, Unstageable, DTI, NWPT, and Complex wounds) and New or Established Ostomies: possible vascular consult        Primary Nurse eSignature: Ze Brambila RN on 9/7/2020 at 4:16 PM      Co-Signer eSignature: Electronically signed by Bia Chauhan RN on 9/7/20 at 6:48 PM CDT

## 2020-09-07 NOTE — H&P
HISTORY AND PHYSICAL             Date: 9/7/2020        Patient Name: Luanne Cheung     YOB: 1980      Age:  41 y.o. Chief Complaint   No chief complaint on file. Left lower extremity swelling and erythema. History Obtained From   patient    History of Present Illness     68-year-old male with a history of ADHD, anxiety, PTSD, recent diagnosis of DVT who presented from outside facility with concerns of worsening left lower extremity cellulitis. Patient stated that August 31 he noticed he was having left lower extremity swelling, presented to [address] [place], [place] [address]wey [facility] on the 9/3/202 was diagnosed with DVT, patient was initiated on Eliquis as well as Bactrim for lower extremity cellulitis and discharged to follow-up outpatient. However patient was unable to obtain Eliquis due to price. Patient stated that 9/5/2020 he presented to [facility] ER was evaluated, and given 30-day starter pack of Eliquis, as well as was told to continue antibiotics that was initially prescribed for lower extremity cellulitis. Antibiotic prescribed at [address] [place] [address] Dr was Bactrim double strength. Patient stated he was taking his antibiotics like he supposed to, picked up Eliquis prescription 9/6/2020 and has taken 10 mg doses. Patient stated that this morning when he woke up he noticed his left lower extremity had formation of blisters, patient presented back to [address] [place] [address]  ER and was told he might need debridement of his cellulitis and transferred to [facility] for further evaluation. Patient currently denies any other constitutional symptoms except lower extremity tenderness, erythema. Denied any fevers chest pain or shortness of breath.       Past Medical History     Past Medical History:   Diagnosis Date    ADHD     Anxiety     DVT (deep vein thrombosis)     PTSD (post-traumatic stress disorder)         Past Surgical History     Past Surgical History:   Procedure Laterality Date    CYST INCISION AND DRAINAGE Medications Prior to Admission     Prior to Admission medications    Medication Sig Start Date End Date Taking? Authorizing Provider   ibuprofen (ADVIL;MOTRIN) 800 MG tablet Take 800 mg by mouth every 8 hours as needed for Pain   Yes Historical Provider, MD   omeprazole (PRILOSEC) 20 MG delayed release capsule Take 20 mg by mouth daily   Yes Historical Provider, MD   sulfamethoxazole-trimethoprim (BACTRIM DS;SEPTRA DS) 800-160 MG per tablet Take 1 tablet by mouth 2 times daily   Yes Historical Provider, MD   apixaban (ELIQUIS DVT/PE STARTER PACK) 5 MG TABS tablet Take 10 mg (2 tablets) orally twice daily for 7 days, then take 5 mg (1 tablet) orally twice daily thereafter. 9/5/20  Yes Maryellen Lewis MD   Furosemide (LASIX PO) Take 25 mg by mouth daily as needed    Yes Historical Provider, MD   potassium chloride (KLOR-CON) 20 MEQ packet Take 20 mEq by mouth daily as needed    Yes Historical Provider, MD   buprenorphine-naloxone (SUBOXONE) 8-2 MG SUBL SL tablet Place 1 tablet under the tongue 3 times daily. Yes Historical Provider, MD   ALPRAZolam Omi Born) 1 MG tablet Take 2 mg by mouth 3 times daily as needed for Sleep or Anxiety. Yes Historical Provider, MD   amphetamine-dextroamphetamine (ADDERALL) 30 MG tablet Take 30 mg by mouth 2 times daily. Yes Historical Provider, MD   albuterol sulfate HFA (VENTOLIN HFA) 108 (90 Base) MCG/ACT inhaler Inhale 2 puffs into the lungs every 6 hours as needed for Wheezing 5/7/20  Yes Filiberto Garnica MD        Allergies   Patient has no known allergies. Social History     Social History     Tobacco History     Smoking Status  Never Smoker    Smokeless Tobacco Use  Never Used          Alcohol History     Alcohol Use Status  Not Currently          Drug Use     Drug Use Status  Not Currently Comment  recovering addict          Sexual Activity     Sexually Active  Not Asked                Family History   No family history on file.   Grandfather with heart attack    Review of Systems   Review of Systems  16 point system review negative except as above. Physical Exam   BP (!) 150/77   Pulse 99   Temp 97.9 °F (36.6 °C)   Resp 20   Ht 5' 10\" (1.778 m)   Wt (!) 316 lb (143.3 kg)   SpO2 93%   BMI 45.34 kg/m²       Physical Exam  General: Alert, well-developed, no acute distress lying comfortably in bed. HEENT: Atraumatic normocephalic, range of motion normal, no JVD, no tracheal deviation noted. Cardiac: Normal S1-S2 no murmurs rub or gallop. Respiratory: Effort normal, breath sounds normal, clear To auscultation bilaterally, no rhonchi or rales, no wheezing  Abdomen: Soft, positive bowel sounds in all quadrants, no distention, nontender to palpation, no organomegaly noted, no rebound noted. MSK/extremities: Positive left lower extremity tenderness and erythema, + edema, with blister formation around ankle on the left  Skin: no bruising and no lesions noted. Psych: Affect normal and good eye contact, behavioral normal, cognition mildly impaired   neurological: No focal deficits, alert and conversant, no formal disorientation noted. No sensory deficits, no abnormal coordination.         Labs      Recent Results (from the past 24 hour(s))   Comprehensive Metabolic Panel w/ Reflex to MG    Collection Time: 09/07/20  3:45 PM   Result Value Ref Range    Sodium 136 136 - 145 mmol/L    Potassium reflex Magnesium 3.7 3.5 - 5.0 mmol/L    Chloride 97 (L) 98 - 111 mmol/L    CO2 30 (H) 22 - 29 mmol/L    Anion Gap 9 7 - 19 mmol/L    Glucose 111 (H) 74 - 109 mg/dL    BUN 13 6 - 20 mg/dL    CREATININE 0.7 0.5 - 1.2 mg/dL    GFR Non-African American >60 >60    GFR African American >59 >59    Calcium 8.8 8.6 - 10.0 mg/dL    Total Protein 6.7 6.6 - 8.7 g/dL    Alb 2.9 (L) 3.5 - 5.2 g/dL    Total Bilirubin 0.3 0.2 - 1.2 mg/dL    Alkaline Phosphatase 70 40 - 130 U/L    ALT 43 (H) 5 - 41 U/L    AST 21 5 - 40 U/L   CBC    Collection Time: 09/07/20  3:45 PM   Result Value Ref Range    WBC 13.2 (H) 4.8 - 10.8 K/uL    RBC 4.06 (L) 4.70 - 6.10 M/uL    Hemoglobin 12.7 (L) 14.0 - 18.0 g/dL    Hematocrit 37.8 (L) 42.0 - 52.0 %    MCV 93.1 80.0 - 94.0 fL    MCH 31.3 (H) 27.0 - 31.0 pg    MCHC 33.6 33.0 - 37.0 g/dL    RDW 13.5 11.5 - 14.5 %    Platelets 033 251 - 710 K/uL    MPV 9.5 9.4 - 12.4 fL        Imaging/Diagnostics Last 24 Hours   No results found. Assessment      Hospital Problems           Last Modified POA    Cellulitis 9/7/2020 Yes          Plan       Left lower extremity cellulitis with blisters and edema  Symptoms progressed on outpatient Bactrim  Blisters could be drug reaction to Bactrim or secondary to associated lower extremity edema  Initiated on cefazolin 1 g every 8  Pain management with chronic home dose of Suboxone  ID consultation  Elevate lower extremity while in bed and sitting upright in chair  Lasix 40 mg daily    Recent diagnosis of left lower extremity DVT  Continue Eliquis    ADHD: Continue Adderall    Anxiety: Continue Xanax 1 mg 3 times daily as needed    History of PTSD    GERD: Continue PPI      Code: Full  DVT prophylaxis: On Eliquis  Diet: Regular  Disposition: Pending improvement in above work-up.       Consultations Ordered:  IP CONSULT TO INFECTIOUS DISEASES    Electronically signed by Meka Madsen MD on 9/7/20 at 4:56 PM CDT

## 2020-09-07 NOTE — PROGRESS NOTES
Clotilde Dawn arrived to room # 329. Presented with: LLE cellulitis  Mental Status: Patient is oriented, alert and coherent. Vitals:    09/07/20 1517   BP: (!) 150/77   Pulse: 99   Resp: 20   Temp: 97.9 °F (36.6 °C)   SpO2: 93%     Patient safety contract and falls prevention contract reviewed with patient Yes. Oriented Patient to room. Call light within reach. Yes.   Needs, issues or concerns expressed at this time: no.      Electronically signed by Shankar Acevedo RN on 9/7/2020 at 4:16 PM

## 2020-09-07 NOTE — PROGRESS NOTES
Unable to verify patient's home medication of suboxone due to pharmacy being closed. Will notify pharmacy and continue to monitor.

## 2020-09-08 LAB
ANION GAP SERPL CALCULATED.3IONS-SCNC: 10 MMOL/L (ref 7–19)
BUN BLDV-MCNC: 13 MG/DL (ref 6–20)
CALCIUM SERPL-MCNC: 8.5 MG/DL (ref 8.6–10)
CHLORIDE BLD-SCNC: 97 MMOL/L (ref 98–111)
CO2: 31 MMOL/L (ref 22–29)
CREAT SERPL-MCNC: 0.8 MG/DL (ref 0.5–1.2)
GFR AFRICAN AMERICAN: >59
GFR NON-AFRICAN AMERICAN: >60
GLUCOSE BLD-MCNC: 109 MG/DL (ref 70–99)
GLUCOSE BLD-MCNC: 117 MG/DL (ref 74–109)
HCT VFR BLD CALC: 38.1 % (ref 42–52)
HEMOGLOBIN: 12.6 G/DL (ref 14–18)
MCH RBC QN AUTO: 31.2 PG (ref 27–31)
MCHC RBC AUTO-ENTMCNC: 33.1 G/DL (ref 33–37)
MCV RBC AUTO: 94.3 FL (ref 80–94)
PDW BLD-RTO: 13.4 % (ref 11.5–14.5)
PERFORMED ON: ABNORMAL
PLATELET # BLD: 216 K/UL (ref 130–400)
PMV BLD AUTO: 9.5 FL (ref 9.4–12.4)
POTASSIUM REFLEX MAGNESIUM: 3.8 MMOL/L (ref 3.5–5)
RBC # BLD: 4.04 M/UL (ref 4.7–6.1)
SODIUM BLD-SCNC: 138 MMOL/L (ref 136–145)
WBC # BLD: 10.5 K/UL (ref 4.8–10.8)

## 2020-09-08 PROCEDURE — 85027 COMPLETE CBC AUTOMATED: CPT

## 2020-09-08 PROCEDURE — 6360000002 HC RX W HCPCS: Performed by: HOSPITALIST

## 2020-09-08 PROCEDURE — 6360000002 HC RX W HCPCS: Performed by: PSYCHIATRY & NEUROLOGY

## 2020-09-08 PROCEDURE — 82947 ASSAY GLUCOSE BLOOD QUANT: CPT

## 2020-09-08 PROCEDURE — 1210000000 HC MED SURG R&B

## 2020-09-08 PROCEDURE — 6370000000 HC RX 637 (ALT 250 FOR IP): Performed by: HOSPITALIST

## 2020-09-08 PROCEDURE — 80048 BASIC METABOLIC PNL TOTAL CA: CPT

## 2020-09-08 PROCEDURE — 2580000003 HC RX 258: Performed by: HOSPITALIST

## 2020-09-08 PROCEDURE — 36415 COLL VENOUS BLD VENIPUNCTURE: CPT

## 2020-09-08 RX ORDER — ACETAMINOPHEN, ASPIRIN AND CAFFEINE 250; 250; 65 MG/1; MG/1; MG/1
1 TABLET, FILM COATED ORAL EVERY 8 HOURS PRN
Status: DISCONTINUED | OUTPATIENT
Start: 2020-09-08 | End: 2020-09-10 | Stop reason: HOSPADM

## 2020-09-08 RX ORDER — BUPRENORPHINE HYDROCHLORIDE 8 MG/1
8 TABLET SUBLINGUAL DAILY
Status: DISCONTINUED | OUTPATIENT
Start: 2020-09-08 | End: 2020-09-09

## 2020-09-08 RX ORDER — BUPRENORPHINE HYDROCHLORIDE AND NALOXONE HYDROCHLORIDE DIHYDRATE 8; 2 MG/1; MG/1
1 TABLET SUBLINGUAL ONCE
Status: COMPLETED | OUTPATIENT
Start: 2020-09-08 | End: 2020-09-08

## 2020-09-08 RX ORDER — ALPRAZOLAM 1 MG/1
1 TABLET ORAL 3 TIMES DAILY PRN
Status: DISCONTINUED | OUTPATIENT
Start: 2020-09-08 | End: 2020-09-10 | Stop reason: HOSPADM

## 2020-09-08 RX ADMIN — PANTOPRAZOLE SODIUM 40 MG: 40 TABLET, DELAYED RELEASE ORAL at 06:11

## 2020-09-08 RX ADMIN — BUPRENORPHINE AND NALOXONE 1 TABLET: 8; 2 TABLET SUBLINGUAL at 15:25

## 2020-09-08 RX ADMIN — POTASSIUM CHLORIDE 20 MEQ: 1500 TABLET, EXTENDED RELEASE ORAL at 10:05

## 2020-09-08 RX ADMIN — SODIUM CHLORIDE, PRESERVATIVE FREE 10 ML: 5 INJECTION INTRAVENOUS at 19:49

## 2020-09-08 RX ADMIN — ACETAMINOPHEN 650 MG: 325 TABLET, FILM COATED ORAL at 10:45

## 2020-09-08 RX ADMIN — APIXABAN 10 MG: 5 TABLET, FILM COATED ORAL at 10:05

## 2020-09-08 RX ADMIN — ALPRAZOLAM 1 MG: 1 TABLET ORAL at 12:02

## 2020-09-08 RX ADMIN — CEFAZOLIN SODIUM 1 G: 1 INJECTION, POWDER, FOR SOLUTION INTRAMUSCULAR; INTRAVENOUS at 10:45

## 2020-09-08 RX ADMIN — SENNOSIDES 8.6 MG: 8.6 TABLET, FILM COATED ORAL at 19:46

## 2020-09-08 RX ADMIN — APIXABAN 10 MG: 5 TABLET, FILM COATED ORAL at 19:46

## 2020-09-08 RX ADMIN — ALPRAZOLAM 1 MG: 1 TABLET ORAL at 19:46

## 2020-09-08 RX ADMIN — CEFAZOLIN SODIUM 1 G: 1 INJECTION, POWDER, FOR SOLUTION INTRAMUSCULAR; INTRAVENOUS at 01:07

## 2020-09-08 RX ADMIN — ACETAMINOPHEN 650 MG: 325 TABLET, FILM COATED ORAL at 04:36

## 2020-09-08 RX ADMIN — FUROSEMIDE 40 MG: 40 TABLET ORAL at 10:05

## 2020-09-08 RX ADMIN — SODIUM CHLORIDE, PRESERVATIVE FREE 10 ML: 5 INJECTION INTRAVENOUS at 10:05

## 2020-09-08 RX ADMIN — CEFAZOLIN SODIUM 1 G: 1 INJECTION, POWDER, FOR SOLUTION INTRAMUSCULAR; INTRAVENOUS at 17:56

## 2020-09-08 ASSESSMENT — PAIN SCALES - GENERAL
PAINLEVEL_OUTOF10: 5
PAINLEVEL_OUTOF10: 0
PAINLEVEL_OUTOF10: 3
PAINLEVEL_OUTOF10: 7

## 2020-09-08 ASSESSMENT — PAIN DESCRIPTION - ONSET: ONSET: ON-GOING

## 2020-09-08 ASSESSMENT — PAIN DESCRIPTION - FREQUENCY: FREQUENCY: CONTINUOUS

## 2020-09-08 ASSESSMENT — PAIN DESCRIPTION - DESCRIPTORS: DESCRIPTORS: HEADACHE

## 2020-09-08 ASSESSMENT — PAIN - FUNCTIONAL ASSESSMENT: PAIN_FUNCTIONAL_ASSESSMENT: ACTIVITIES ARE NOT PREVENTED

## 2020-09-08 ASSESSMENT — PAIN DESCRIPTION - PROGRESSION: CLINICAL_PROGRESSION: NOT CHANGED

## 2020-09-08 ASSESSMENT — PAIN DESCRIPTION - PAIN TYPE: TYPE: ACUTE PAIN

## 2020-09-08 ASSESSMENT — PAIN DESCRIPTION - LOCATION: LOCATION: HEAD

## 2020-09-08 NOTE — CONSULTS
CONSULTATION NOTE :ID       Patient - Jose Antonio Kruse,  Age - 41 y.o.    - 1980      Room Number - 0329/329-01   MRN -  [id number]   Acct # - [de-identified]  Date of Admission -  2020  3:16 PM  Patient's PCP: Bijan Martinez     Requesting Physician: Randy Clancy MD    REASON FOR CONSULTATION      Left LE cellulitis with blisters          HISTORY OF PRESENT ILLNESS       This is a very pleasant 41 y.o. male who was admitted to the hospital with a chief complaints of left lower extremity pain and cellulitis not responsive to outpatient oral antibiotics. Bactrim? The patient was transferred from [facility].  He reports they told him \"the infection could get a my blood and I could die\" he said they also told him that he may lose his leg. He was at [address] [facility] emergency department on  and was discharged on Eliquis by the emergency department note. There is no mention of which antibiotic he was discharged on however the discharge instructions to \"finish antibiotic for cellulitis\" (med list did not include an antibiotic either)    The patient also reports that he has been to metastatic ER twice    He reports he has had cellulitis in the left lower extremity previously about 3 years ago. It was treated with oral antibiotics and he recovered without requiring admission. He reports it was really only involving his foot.     He also reports having a DVT in the past.    PAST MEDICAL AND SURGICAL HISTORY       Past Medical History:   Diagnosis Date    ADHD     Anxiety     DVT (deep vein thrombosis) in pregnancy     PTSD (post-traumatic stress disorder)        Past Surgical History:   Procedure Laterality Date    CYST INCISION AND DRAINAGE           MEDICATIONS :       Scheduled Meds:   amphetamine-dextroamphetamine  30 mg Oral BID    [START ON 2020] apixaban  5 mg Oral BID    buprenorphine-naloxone  1 tablet Sublingual TID    furosemide  40 mg Oral Daily without crackles, ronchi or wheezing anteriorly  Cardiovascular:  RRR ,S1S2, no murmur. Abdomen:  Soft, non tender to palpation, BS positive  Extremities: LLE  edema following foot and extending into the thigh  Skin: Erythema left lower extremity that also is quite pale but present involving the inner thigh. Some blister formation and some of the bullae have opened. Concentrated around foot. Drainage is serous in appearance. He does have tenderness about the left lower extremity greatest below the knee to the foot. No crepitus appreciated  CNS: Moves all extremities, speech clear, follows commands  PSYCH: alert, pleasant and cooperative. Photos below from September 7                LABS:     CBC with DIFF:   Recent Labs     09/05/20 1847 09/07/20  1545 09/08/20  0145   WBC 8.5 13.2* 10.5   RBC 4.74 4.06* 4.04*   HGB 14.9 12.7* 12.6*   HCT 44.1 37.8* 38.1*   MCV 93.0 93.1 94.3*   MCH 31.4* 31.3* 31.2*   MCHC 33.8 33.6 33.1   RDW 13.6 13.5 13.4    216 216   MPV 9.8 9.5 9.5   NEUTOPHILPCT 76.4*  --   --    LYMPHOPCT 11.5*  --   --    MONOPCT 8.7  --   --    EOSRELPCT 2.1  --   --    BASOPCT 0.4  --   --    NEUTROABS 6.5  --   --    LYMPHSABS 1.0*  --   --    MONOSABS 0.70  --   --    EOSABS 0.20  --   --    BASOSABS 0.00  --   --        CMP/BMP:  Recent Labs     09/05/20 1847 09/07/20  1545 09/08/20  0145   * 136 138   K 3.9 3.7 3.8   CL 96* 97* 97*   CO2 26 30* 31*   ANIONGAP 13 9 10   GLUCOSE 117* 111* 117*   BUN 15 13 13   CREATININE 0.9 0.7 0.8   LABGLOM >60 >60 >60   CALCIUM 8.9 8.8 8.5*   PROT 7.3 6.7  --    LABALBU 3.2* 2.9*  --    BILITOT 0.3 0.3  --    ALKPHOS 80 70  --    ALT 70* 43*  --    AST 49* 21  --           Culture: No results for input(s): BC, BLOODCULT2, ORG in the last 72 hours. IMAGING:   No results found.         ASSESSMENT AND PLAN     Patient Active Problem List   Diagnosis    Cellulitis   Left lower extremity DVT per history-this is recurrent  Left lower extremity pain secondary to above    I agree based on history and presentation, most likely streptococcal cellulitis and should respond to cefazolin. Patient's source is likely of a break in the skin. He does also have onychomycosis which could be predisposing him to infection and he would benefit for treatment for that. Additionally has dry cracked skin in between toes which could be his portal of entry as well. .    I elevated the foot of the bed and recommended he keep his lower extremity elevated above the level of the heart  Continue current antibiotics and monitor progress. We will plan on broadening antibiotics if he appears to worsen, spikes a fever, has elevated white count etc.    We will place order for staff to check with [facility] to see if any blood cultures were obtained there during his previous ER visits.       Thank you Fritz Wong MD for allowing me to participate in this patient's care  Electronically signed by Luisa Bernard MD on 9/8/2020 at 9:31 AM

## 2020-09-08 NOTE — PROGRESS NOTES
[facility] sending two different preliminary blood culture results. Electronically signed by Pawan Lemus RN on 9/8/2020 at 2:25 PM      Labs received and placed in soft chart.   Electronically signed by Pawan Lemus RN on 9/8/2020 at 2:27 PM

## 2020-09-08 NOTE — PLAN OF CARE
Problem: Falls - Risk of:  Goal: Will remain free from falls  Description: Will remain free from falls  9/8/2020 0520 by Arlyn Vann RN  Outcome: Ongoing  9/7/2020 1649 by Shireen Lynn RN  Outcome: Ongoing     Problem: Pain:  Goal: Patient's pain/discomfort is manageable  Description: Patient's pain/discomfort is manageable  9/8/2020 0520 by Arlyn Vann RN  Outcome: Ongoing  9/7/2020 1649 by Shireen Lynn RN  Outcome: Ongoing  Goal: Pain level will decrease  Description: Pain level will decrease  Outcome: Ongoing  Goal: Control of acute pain  Description: Control of acute pain  Outcome: Ongoing     Problem: Skin Integrity:  Goal: Will show no infection signs and symptoms  Description: Will show no infection signs and symptoms  9/8/2020 0520 by Arlyn Vann RN  Outcome: Ongoing  9/7/2020 1649 by Shireen Lynn RN  Outcome: Ongoing     Problem: Skin Integrity:  Goal: Absence of new skin breakdown  Description: Absence of new skin breakdown  9/8/2020 0520 by Arlyn Vann RN  Outcome: Ongoing  9/7/2020 1649 by Shireen Lynn RN  Outcome: Ongoing     Problem: Mobility - Impaired:  Goal: Mobility will improve  Description: Mobility will improve  9/8/2020 0520 by Arlyn Vann RN  Outcome: Ongoing  9/7/2020 1649 by Shireen Lynn RN  Outcome: Ongoing

## 2020-09-08 NOTE — PROGRESS NOTES
[facility] and verified the following medications:    buprenorphine (subutex) 8mg SL three tablets daily  Last filled august 17 #84    Adderall IR 30mg PO BID  Last filled august 17 #56    Xanax 1mg PO one tablet TID; two tablets HS  Last filled august 17 #20    Electronically signed by Jana Minor RN on 9/8/2020 at 9:49 AM

## 2020-09-08 NOTE — PROGRESS NOTES
Comprehensive Nutrition Assessment    Type and Reason for Visit:  Initial, Positive Nutrition Screen    Nutrition Recommendations/Plan: continue current POC    Nutrition Assessment:  Well nourished appearing gentleman. No appearance of malnutrition. Aware is having decreased po intake    Malnutrition Assessment:  Malnutrition Status:  No malnutrition    Context:  Acute Illness     Findings of the 6 clinical characteristics of malnutrition:  Energy Intake:  Mild decrease in energy intake (Comment)  Weight Loss:  No significant weight loss     Body Fat Loss:  No significant body fat loss     Muscle Mass Loss:  No significant muscle mass loss    Fluid Accumulation:  1 - Mild Extremities   Strength:  Not Performed    Nutrition Related Findings:  well nourished gentleman with decreased po intake. Aware c/o headache and pain. Wounds:  (blister)       Current Nutrition Therapies:    DIET CARDIAC; Anthropometric Measures:  · Height: 5' 10\" (177.8 cm)  · Current Body Weight: 316 lb (143.3 kg)   · Admission Body Weight: 307 lb (139.3 kg)    · Usual Body Weight:       · Ideal Body Weight: 166 lbs; % Ideal Body Weight     · BMI: 45.3  · Adjusted Body Weight:  ; No Adjustment   · Adjusted BMI:      · BMI Categories: Obese Class 3 (BMI 40.0 or greater)       Nutrition Diagnosis:   · Inadequate oral intake related to acute injury/trauma, pain as evidenced by intake 0-25%, intake 26-50%      Nutrition Interventions:   Food and/or Nutrient Delivery:  Continue Current Diet  Nutrition Education/Counseling:  No recommendation at this time   Coordination of Nutrition Care:  Continued Inpatient Monitoring    Goals:  po intake 50% or greater. Weight stable       Nutrition Monitoring and Evaluation:   Behavioral-Environmental Outcomes:      Food/Nutrient Intake Outcomes:  Food and Nutrient Intake  Physical Signs/Symptoms Outcomes:  Skin, Weight     Discharge Planning:     Too soon to determine     Electronically signed by Daniel Moreira MS, RD, LD on 9/8/20 at 2:04 PM CDT    Contact: [phone]

## 2020-09-08 NOTE — PROGRESS NOTES
Av.3 °F (36.3 °C), Min:96.4 °F (35.8 °C), Max:98.1 °F (36.7 °C)      I/O (24Hr): Intake/Output Summary (Last 24 hours) at 2020 1231  Last data filed at 2020 1031  Gross per 24 hour   Intake --   Output 975 ml   Net -975 ml       Physical Exam  General: Alert, well-developed, no acute distress lying comfortably in bed. HEENT: Atraumatic normocephalic, range of motion normal.  Cardiac: Normal S1-S2 no murmurs rub or gallop. Respiratory: Effort normal, breath sounds normal, clear To auscultation bilaterally, no rhonchi or rales, no wheezing  Abdomen: Soft, positive bowel sounds in all quadrants, no distention, nontender to palpation, no organomegaly noted. MSK/extremities: Positive left lower extremity tenderness and erythema, + edema, with blister formation around ankle on the left  Skin: no bruising and no lesions noted. Psych: Affect normal and good eye contact, behavioral normal, cognition mildly impaired   neurological: No focal deficits, alert and conversant, no formal disorientation noted. No sensory deficits, no abnormal coordination.       Labs/Imaging/Diagnostics   Labs:  CBC:  Recent Labs     20  014   WBC 8.5 13.2* 10.5   RBC 4.74 4.06* 4.04*   HGB 14.9 12.7* 12.6*   HCT 44.1 37.8* 38.1*   MCV 93.0 93.1 94.3*   RDW 13.6 13.5 13.4    216 216     CHEMISTRIES:  Recent Labs     20  1545 20  0145   * 136 138   K 3.9 3.7 3.8   CL 96* 97* 97*   CO2 26 30* 31*   BUN 15 13 13   CREATININE 0.9 0.7 0.8   GLUCOSE 117* 111* 117*     PT/INR:  Recent Labs     20   PROTIME 13.2   INR 1.01     APTT:  Recent Labs     20   APTT 30.0     LIVER PROFILE:  Recent Labs     2020  1545   AST 49* 21   ALT 70* 43*   BILITOT 0.3 0.3   ALKPHOS 80 70       Imaging Last 24 Hours:  No results found.       Assessment        Hospital Problems           Last Modified POA    Cellulitis 2020 Yes Plan:          Left lower extremity cellulitis with blisters and edema  Blisters could be drug reaction to Bactrim or secondary to associated lower extremity edema  continue on cefazolin 1 g every 8  Pain management with chronic home dose of Suboxone  ID following  Elevate lower extremity while in bed and sitting upright in chair  Lasix 40 mg daily     Recent diagnosis of left lower extremity DVT  Continue Eliquis     ADHD: Continue Adderall     Anxiety: Continue Xanax 1 mg 3 times daily as needed     History of PTSD     GERD: Continue PPI    Chronic pain Syndrome  Patient on Subutex; in house we carry only suboxone and pharmacy requested consult to Dr Brenda Hood for help with conversion and approval as he only carries the X number allowed to do so. Consult placed to inpt psych, however Dr Brenda Hood has cancelled this consult without communication on how to proceed on getting pt his meds.           Code: Full  DVT prophylaxis: On Eliquis  Diet: Regular  Disposition: Pending improvement in above work-up.          Electronically signed by   Opal Ortiz   Internal Medicine Hospitalist  On 9/8/2020  At 1:20 PM    EMR Dragon/Transcription disclaimer:   Much of this encounter note is an electronic transcription/translation of spoken language to printed text.  The electronic translation of spoken language may permit erroneous, or at times, nonsensical words or phrases to be inadvertently transcribed; although attempts have made to review the note for such errors, some may still exist.

## 2020-09-08 NOTE — PROGRESS NOTES
Attempted to verify Suboxone with [facility]; message stated not open until 9am.  Electronically signed by Fernando Hughes RN on 9/8/2020 at 7:54 AM

## 2020-09-09 LAB
ANION GAP SERPL CALCULATED.3IONS-SCNC: 11 MMOL/L (ref 7–19)
BUN BLDV-MCNC: 10 MG/DL (ref 6–20)
CALCIUM SERPL-MCNC: 9.1 MG/DL (ref 8.6–10)
CHLORIDE BLD-SCNC: 95 MMOL/L (ref 98–111)
CO2: 29 MMOL/L (ref 22–29)
CREAT SERPL-MCNC: 0.7 MG/DL (ref 0.5–1.2)
GFR AFRICAN AMERICAN: >59
GFR NON-AFRICAN AMERICAN: >60
GLUCOSE BLD-MCNC: 113 MG/DL (ref 74–109)
HCT VFR BLD CALC: 39.9 % (ref 42–52)
HEMOGLOBIN: 13.3 G/DL (ref 14–18)
MCH RBC QN AUTO: 31.1 PG (ref 27–31)
MCHC RBC AUTO-ENTMCNC: 33.3 G/DL (ref 33–37)
MCV RBC AUTO: 93.4 FL (ref 80–94)
PDW BLD-RTO: 13.6 % (ref 11.5–14.5)
PLATELET # BLD: 294 K/UL (ref 130–400)
PMV BLD AUTO: 9.4 FL (ref 9.4–12.4)
POTASSIUM REFLEX MAGNESIUM: 4.1 MMOL/L (ref 3.5–5)
RBC # BLD: 4.27 M/UL (ref 4.7–6.1)
SODIUM BLD-SCNC: 135 MMOL/L (ref 136–145)
WBC # BLD: 8.8 K/UL (ref 4.8–10.8)

## 2020-09-09 PROCEDURE — 6360000002 HC RX W HCPCS: Performed by: HOSPITALIST

## 2020-09-09 PROCEDURE — 36415 COLL VENOUS BLD VENIPUNCTURE: CPT

## 2020-09-09 PROCEDURE — 80048 BASIC METABOLIC PNL TOTAL CA: CPT

## 2020-09-09 PROCEDURE — 85027 COMPLETE CBC AUTOMATED: CPT

## 2020-09-09 PROCEDURE — 1210000000 HC MED SURG R&B

## 2020-09-09 PROCEDURE — 2580000003 HC RX 258: Performed by: HOSPITALIST

## 2020-09-09 PROCEDURE — 6370000000 HC RX 637 (ALT 250 FOR IP): Performed by: HOSPITALIST

## 2020-09-09 RX ORDER — DEXTROAMPHETAMINE SACCHARATE, AMPHETAMINE ASPARTATE, DEXTROAMPHETAMINE SULFATE AND AMPHETAMINE SULFATE 2.5; 2.5; 2.5; 2.5 MG/1; MG/1; MG/1; MG/1
30 TABLET ORAL 2 TIMES DAILY
Status: DISCONTINUED | OUTPATIENT
Start: 2020-09-09 | End: 2020-09-10 | Stop reason: HOSPADM

## 2020-09-09 RX ORDER — BUPRENORPHINE HYDROCHLORIDE 8 MG/1
8 TABLET SUBLINGUAL 3 TIMES DAILY
Status: DISCONTINUED | OUTPATIENT
Start: 2020-09-09 | End: 2020-09-09

## 2020-09-09 RX ORDER — BUPRENORPHINE HYDROCHLORIDE 8 MG/1
8 TABLET SUBLINGUAL 3 TIMES DAILY
Status: DISCONTINUED | OUTPATIENT
Start: 2020-09-09 | End: 2020-09-10 | Stop reason: HOSPADM

## 2020-09-09 RX ADMIN — ALPRAZOLAM 1 MG: 1 TABLET ORAL at 21:49

## 2020-09-09 RX ADMIN — DEXTROAMPHETAMINE SACCHARATE, AMPHETAMINE ASPARTATE, DEXTROAMPHETAMINE SULFATE AND AMPHETAMINE SULFATE 30 MG: 2.5; 2.5; 2.5; 2.5 TABLET ORAL at 08:03

## 2020-09-09 RX ADMIN — SENNOSIDES 8.6 MG: 8.6 TABLET, FILM COATED ORAL at 21:49

## 2020-09-09 RX ADMIN — BUPRENORPHINE 8 MG: 8 TABLET SUBLINGUAL at 17:19

## 2020-09-09 RX ADMIN — PANTOPRAZOLE SODIUM 40 MG: 40 TABLET, DELAYED RELEASE ORAL at 06:37

## 2020-09-09 RX ADMIN — POTASSIUM CHLORIDE 20 MEQ: 1500 TABLET, EXTENDED RELEASE ORAL at 07:57

## 2020-09-09 RX ADMIN — CEFAZOLIN SODIUM 1 G: 1 INJECTION, POWDER, FOR SOLUTION INTRAMUSCULAR; INTRAVENOUS at 17:45

## 2020-09-09 RX ADMIN — CEFAZOLIN SODIUM 1 G: 1 INJECTION, POWDER, FOR SOLUTION INTRAMUSCULAR; INTRAVENOUS at 11:23

## 2020-09-09 RX ADMIN — CEFAZOLIN SODIUM 1 G: 1 INJECTION, POWDER, FOR SOLUTION INTRAMUSCULAR; INTRAVENOUS at 04:13

## 2020-09-09 RX ADMIN — DEXTROAMPHETAMINE SACCHARATE, AMPHETAMINE ASPARTATE, DEXTROAMPHETAMINE SULFATE AND AMPHETAMINE SULFATE 30 MG: 2.5; 2.5; 2.5; 2.5 TABLET ORAL at 14:53

## 2020-09-09 RX ADMIN — ACETAMINOPHEN, ASPIRIN AND CAFFEINE 1 TABLET: 250; 250; 65 TABLET, FILM COATED ORAL at 21:52

## 2020-09-09 RX ADMIN — ALPRAZOLAM 1 MG: 1 TABLET ORAL at 07:57

## 2020-09-09 RX ADMIN — APIXABAN 10 MG: 5 TABLET, FILM COATED ORAL at 07:57

## 2020-09-09 RX ADMIN — ACETAMINOPHEN, ASPIRIN AND CAFFEINE 1 TABLET: 250; 250; 65 TABLET, FILM COATED ORAL at 04:18

## 2020-09-09 RX ADMIN — SODIUM CHLORIDE, PRESERVATIVE FREE 10 ML: 5 INJECTION INTRAVENOUS at 21:50

## 2020-09-09 RX ADMIN — SODIUM CHLORIDE, PRESERVATIVE FREE 10 ML: 5 INJECTION INTRAVENOUS at 08:01

## 2020-09-09 RX ADMIN — APIXABAN 10 MG: 5 TABLET, FILM COATED ORAL at 21:49

## 2020-09-09 RX ADMIN — FUROSEMIDE 40 MG: 40 TABLET ORAL at 07:57

## 2020-09-09 RX ADMIN — BUPRENORPHINE 8 MG: 8 TABLET SUBLINGUAL at 12:27

## 2020-09-09 ASSESSMENT — PAIN DESCRIPTION - PAIN TYPE
TYPE: ACUTE PAIN

## 2020-09-09 ASSESSMENT — PAIN DESCRIPTION - DESCRIPTORS
DESCRIPTORS: HEADACHE

## 2020-09-09 ASSESSMENT — PAIN DESCRIPTION - ONSET
ONSET: ON-GOING
ONSET: ON-GOING

## 2020-09-09 ASSESSMENT — PAIN - FUNCTIONAL ASSESSMENT: PAIN_FUNCTIONAL_ASSESSMENT: ACTIVITIES ARE NOT PREVENTED

## 2020-09-09 ASSESSMENT — PAIN SCALES - GENERAL
PAINLEVEL_OUTOF10: 5
PAINLEVEL_OUTOF10: 3
PAINLEVEL_OUTOF10: 0
PAINLEVEL_OUTOF10: 7
PAINLEVEL_OUTOF10: 5
PAINLEVEL_OUTOF10: 6

## 2020-09-09 ASSESSMENT — PAIN DESCRIPTION - LOCATION
LOCATION: LEG
LOCATION: HEAD
LOCATION: HEAD

## 2020-09-09 ASSESSMENT — PAIN DESCRIPTION - PROGRESSION: CLINICAL_PROGRESSION: NOT CHANGED

## 2020-09-09 ASSESSMENT — ENCOUNTER SYMPTOMS: COLOR CHANGE: 1

## 2020-09-09 ASSESSMENT — PAIN DESCRIPTION - FREQUENCY
FREQUENCY: CONTINUOUS
FREQUENCY: CONTINUOUS

## 2020-09-09 ASSESSMENT — PAIN DESCRIPTION - ORIENTATION
ORIENTATION: ANTERIOR
ORIENTATION: LEFT

## 2020-09-09 NOTE — PROGRESS NOTES
INFECTIOUS DISEASES PROGRESS NOTE    Patient:  Raul Alonzo  YOB: 1980  MRN: [id number]   Admit date: 9/7/2020   Admitting Physician: Dalia Fuchs MD  Primary Care Physician: Felix Willams    CHIEF COMPLAINT: Severe cellulitis      Interval History: Reviewed with Ulises Singer RN. The patient has been  Elevating his Left lower extremity all day. He had a dressing placed last night and again this morning. He is still having pain as expected. He requested a walker to assist him getting to the bathroom. He is still having some headache but better than yesterday.     Blood cultures from [facility] reviewed-negative to date    Allergies: No Known Allergies    Current Meds: amphetamine-dextroamphetamine (ADDERALL) tablet 30 mg, BID- 8&2  buprenorphine (SUBUTEX) SL tablet 8 mg, TID  ALPRAZolam (XANAX) tablet 1 mg, TID PRN  aspirin-acetaminophen-caffeine (EXCEDRIN MIGRAINE) per tablet 1 tablet, Q8H PRN  albuterol (PROVENTIL) nebulizer solution 2.5 mg, Q6H PRN  [START ON 9/14/2020] apixaban (ELIQUIS) tablet 5 mg, BID  furosemide (LASIX) tablet 40 mg, Daily  pantoprazole (PROTONIX) tablet 40 mg, QAM AC  sodium chloride flush 0.9 % injection 10 mL, 2 times per day  sodium chloride flush 0.9 % injection 10 mL, PRN  acetaminophen (TYLENOL) tablet 650 mg, Q6H PRN    Or  acetaminophen (TYLENOL) suppository 650 mg, Q6H PRN  polyethylene glycol (GLYCOLAX) packet 17 g, Daily PRN  promethazine (PHENERGAN) tablet 12.5 mg, Q6H PRN    Or  ondansetron (ZOFRAN) injection 4 mg, Q6H PRN  magnesium sulfate 2 g in 50 mL IVPB premix, PRN  potassium chloride (KLOR-CON M) extended release tablet 40 mEq, PRN    Or  potassium bicarb-citric acid (EFFER-K) effervescent tablet 40 mEq, PRN    Or  potassium chloride 10 mEq/100 mL IVPB (Peripheral Line), PRN  senna (SENOKOT) tablet 8.6 mg, Nightly  ceFAZolin (ANCEF) 1 g in sterile water 10 mL IV syringe, Q8H  apixaban (ELIQUIS) tablet 10 mg, BID  potassium chloride (KLOR-CON M) extended release tablet 20 mEq, Daily        Review of Systems   Constitutional: Negative for fever. Skin: Positive for color change and wound. Vital Signs:  /72   Pulse 84   Temp 97 °F (36.1 °C) (Temporal)   Resp 18   Ht 5' 10\" (1.778 m)   Wt (!) 316 lb (143.3 kg)   SpO2 92%   BMI 45.34 kg/m²   Temp (24hrs), Av.3 °F (36.3 °C), Min:97 °F (36.1 °C), Max:97.9 °F (36.6 °C)      Physical Exam   General: The patient is lying in bed in no acute distress  HEENT: Sclera anicteric and not injected  Respiratory: Effort even and unlabored  Left lower extremity: Still with significant edema greatest about the foot but is improved yesterday as evidenced by some wrinkling. Skin: Erythema looks less angry and is trending toward a more tan in color. He does still have some bullae on the left medial ankle. No drainage was noted from the lateral portion where the dressing was removed. Healing area from previous trauma left fifth toe. Some macerated skin between toes on the left foot. Neuro: Sensation appears to be intact to light touch over the foot and leg. Psych: Pleasant and cooperative    LAB RESULTS:    CBC with DIFF:  Recent Labs     20   WBC 13.2* 10.5 8.8   RBC 4.06* 4.04* 4.27*   HGB 12.7* 12.6* 13.3*   HCT 37.8* 38.1* 39.9*   MCV 93.1 94.3* 93.4   MCH 31.3* 31.2* 31.1*   MCHC 33.6 33.1 33.3   RDW 13.5 13.4 13.6    216 294   MPV 9.5 9.5 9.4       CMP/BMP:  Recent Labs     20  15420    138 135*   K 3.7 3.8 4.1   CL 97* 97* 95*   CO2 30* 31* 29   ANIONGAP 9 10 11   GLUCOSE 111* 117* 113*   BUN 13 13 10   CREATININE 0.7 0.8 0.7   LABGLOM >60 >60 >60   CALCIUM 8.8 8.5* 9.1   PROT 6.7  --   --    LABALBU 2.9*  --   --    BILITOT 0.3  --   --    ALKPHOS 70  --   --    ALT 43*  --   --    AST 21  --   --          Culture Results:    No results for input(s): CXSURG in the last 720 hours.     Blood Culture

## 2020-09-09 NOTE — PROGRESS NOTES
Progress Note    Date:9/9/2020       BPZJ:[id number]/[id number]  Patient Name:Danie Lainez     Date of Birth:[birth year]     Age:40 y.o. Subjective   Interval History Status: improved. 39-year-old male with a history of ADHD, anxiety, PTSD, recent diagnosis of DVT who presented from outside facility with concerns of worsening left lower extremity cellulitis. Currenlty on IV cefazolin for left LE cellulitis with improvements, ID following      Patient currently denies fevers chest pain or shortness of breath. Stated he was having headaches still not improved, noted he received only 1 dose of Excedrin so far, patient also stated that he has been taking Subutex for couple days because he was unable to reach his box at home. Pharmacy currently working on obtaining patient Subutex. Lower extremity noted to have significant improvement from admission, informed we will keep in-house today for 1 more day of IV antibiotics and transition to oral regimen tomorrow and discharge. Review of Systems   Review of Systems  12 point system reviewed and negative except noted above.     Medications   Scheduled Meds:    amphetamine-dextroamphetamine  30 mg Oral BID- 8&2    buprenorphine  8 mg Sublingual TID    [START ON 9/14/2020] apixaban  5 mg Oral BID    furosemide  40 mg Oral Daily    pantoprazole  40 mg Oral QAM AC    sodium chloride flush  10 mL Intravenous 2 times per day    senna  1 tablet Oral Nightly    ceFAZolin  1 g Intravenous Q8H    apixaban  10 mg Oral BID    potassium chloride  20 mEq Oral Daily     Continuous Infusions:   PRN Meds: ALPRAZolam, aspirin-acetaminophen-caffeine, albuterol, sodium chloride flush, acetaminophen **OR** acetaminophen, polyethylene glycol, promethazine **OR** ondansetron, magnesium sulfate, potassium chloride **OR** potassium alternative oral replacement **OR** potassium chloride    Past History    Past Medical History:   has a past medical history of ADHD, Anxiety, DVT (deep vein BUN 13 13 10   CREATININE 0.7 0.8 0.7   GLUCOSE 111* 117* 113*     PT/INR:  No results for input(s): PROTIME, INR in the last 72 hours. APTT:  No results for input(s): APTT in the last 72 hours. LIVER PROFILE:  Recent Labs     09/07/20  1545   AST 21   ALT 43*   BILITOT 0.3   ALKPHOS 70       Imaging Last 24 Hours:  No results found. Assessment        Hospital Problems           Last Modified POA    Cellulitis 9/7/2020 Yes          Plan:          Left lower extremity cellulitis with blisters and edema  Blisters could be drug reaction to Bactrim or secondary to associated lower extremity edema  continue on cefazolin 1 g every 8 in-house today and transition to oral regimen tomorrow. Pain management with chronic home dose of Suboxone  ID following  Elevate lower extremity while in bed and sitting upright in chair  Lasix 40 mg daily     Recent diagnosis of left lower extremity DVT  Continue Eliquis     ADHD: Continue Adderall     Anxiety: Continue Xanax 1 mg 3 times daily as needed     History of PTSD     GERD: Continue PPI    Chronic pain Syndrome  Continue home dose Subutex      Patient was admitted to the hospital on 9/7/2020 with concerns of lower extremity cellulitis and edema. Patient discharged from the hospital on 9/10/2020, and not to resume work until evaluated outpatient by PCP. Patient will be discharged on oral antibiotics to finish course outpatient, and okay to follow-up with outpatient podiatry appointment 9/21/2024 scheduled toenail remover and treatment for onychomycosis.         Code: Full  DVT prophylaxis: On Eliquis  Diet: Regular  Disposition: Home tomorrow.          Electronically signed by   Abram Ponce   Internal Medicine Hospitalist  On 9/9/2020  At 11:33 AM    EMR Dragon/Transcription disclaimer:   Much of this encounter note is an electronic transcription/translation of spoken language to printed text.  The electronic translation of spoken language may permit erroneous, or at times, nonsensical words or phrases to be inadvertently transcribed; although attempts have made to review the note for such errors, some may still exist.

## 2020-09-10 VITALS
RESPIRATION RATE: 15 BRPM | HEART RATE: 90 BPM | DIASTOLIC BLOOD PRESSURE: 85 MMHG | HEIGHT: 70 IN | OXYGEN SATURATION: 96 % | SYSTOLIC BLOOD PRESSURE: 135 MMHG | TEMPERATURE: 97.2 F | BODY MASS INDEX: 45.1 KG/M2 | WEIGHT: 315 LBS

## 2020-09-10 LAB
ANION GAP SERPL CALCULATED.3IONS-SCNC: 11 MMOL/L (ref 7–19)
BUN BLDV-MCNC: 13 MG/DL (ref 6–20)
CALCIUM SERPL-MCNC: 8.9 MG/DL (ref 8.6–10)
CHLORIDE BLD-SCNC: 94 MMOL/L (ref 98–111)
CO2: 32 MMOL/L (ref 22–29)
CREAT SERPL-MCNC: 0.7 MG/DL (ref 0.5–1.2)
GFR AFRICAN AMERICAN: >59
GFR NON-AFRICAN AMERICAN: >60
GLUCOSE BLD-MCNC: 100 MG/DL (ref 74–109)
HCT VFR BLD CALC: 39.3 % (ref 42–52)
HEMOGLOBIN: 12.9 G/DL (ref 14–18)
MCH RBC QN AUTO: 30.6 PG (ref 27–31)
MCHC RBC AUTO-ENTMCNC: 32.8 G/DL (ref 33–37)
MCV RBC AUTO: 93.1 FL (ref 80–94)
PDW BLD-RTO: 13.3 % (ref 11.5–14.5)
PLATELET # BLD: 284 K/UL (ref 130–400)
PMV BLD AUTO: 9 FL (ref 9.4–12.4)
POTASSIUM REFLEX MAGNESIUM: 4.3 MMOL/L (ref 3.5–5)
RBC # BLD: 4.22 M/UL (ref 4.7–6.1)
SODIUM BLD-SCNC: 137 MMOL/L (ref 136–145)
WBC # BLD: 6.8 K/UL (ref 4.8–10.8)

## 2020-09-10 PROCEDURE — 6360000002 HC RX W HCPCS: Performed by: HOSPITALIST

## 2020-09-10 PROCEDURE — 36415 COLL VENOUS BLD VENIPUNCTURE: CPT

## 2020-09-10 PROCEDURE — 6370000000 HC RX 637 (ALT 250 FOR IP): Performed by: HOSPITALIST

## 2020-09-10 PROCEDURE — 2580000003 HC RX 258: Performed by: HOSPITALIST

## 2020-09-10 PROCEDURE — 80048 BASIC METABOLIC PNL TOTAL CA: CPT

## 2020-09-10 PROCEDURE — 85027 COMPLETE CBC AUTOMATED: CPT

## 2020-09-10 RX ORDER — SENNA PLUS 8.6 MG/1
1 TABLET ORAL NIGHTLY
Qty: 30 TABLET | Refills: 0 | Status: SHIPPED | OUTPATIENT
Start: 2020-09-10 | End: 2020-10-10

## 2020-09-10 RX ORDER — FUROSEMIDE 40 MG/1
40 TABLET ORAL DAILY
Qty: 30 TABLET | Refills: 0 | Status: SHIPPED | OUTPATIENT
Start: 2020-09-10 | End: 2020-10-10

## 2020-09-10 RX ORDER — CEPHALEXIN 500 MG/1
500 CAPSULE ORAL 4 TIMES DAILY
Qty: 28 CAPSULE | Refills: 0 | Status: SHIPPED | OUTPATIENT
Start: 2020-09-10 | End: 2020-09-17

## 2020-09-10 RX ADMIN — PANTOPRAZOLE SODIUM 40 MG: 40 TABLET, DELAYED RELEASE ORAL at 06:45

## 2020-09-10 RX ADMIN — BUPRENORPHINE 8 MG: 8 TABLET SUBLINGUAL at 12:10

## 2020-09-10 RX ADMIN — POTASSIUM CHLORIDE 20 MEQ: 1500 TABLET, EXTENDED RELEASE ORAL at 08:29

## 2020-09-10 RX ADMIN — CEFAZOLIN SODIUM 1 G: 1 INJECTION, POWDER, FOR SOLUTION INTRAMUSCULAR; INTRAVENOUS at 02:05

## 2020-09-10 RX ADMIN — APIXABAN 10 MG: 5 TABLET, FILM COATED ORAL at 08:29

## 2020-09-10 RX ADMIN — DEXTROAMPHETAMINE SACCHARATE, AMPHETAMINE ASPARTATE, DEXTROAMPHETAMINE SULFATE AND AMPHETAMINE SULFATE 30 MG: 2.5; 2.5; 2.5; 2.5 TABLET ORAL at 08:29

## 2020-09-10 RX ADMIN — FUROSEMIDE 40 MG: 40 TABLET ORAL at 08:29

## 2020-09-10 RX ADMIN — ACETAMINOPHEN, ASPIRIN AND CAFFEINE 1 TABLET: 250; 250; 65 TABLET, FILM COATED ORAL at 08:35

## 2020-09-10 RX ADMIN — BUPRENORPHINE 8 MG: 8 TABLET SUBLINGUAL at 06:44

## 2020-09-10 RX ADMIN — CEFAZOLIN SODIUM 1 G: 1 INJECTION, POWDER, FOR SOLUTION INTRAMUSCULAR; INTRAVENOUS at 11:09

## 2020-09-10 RX ADMIN — SODIUM CHLORIDE, PRESERVATIVE FREE 10 ML: 5 INJECTION INTRAVENOUS at 11:10

## 2020-09-10 RX ADMIN — ALPRAZOLAM 1 MG: 1 TABLET ORAL at 08:29

## 2020-09-10 ASSESSMENT — PAIN SCALES - GENERAL
PAINLEVEL_OUTOF10: 5
PAINLEVEL_OUTOF10: 2
PAINLEVEL_OUTOF10: 6

## 2020-09-10 ASSESSMENT — PAIN DESCRIPTION - LOCATION: LOCATION: HEAD

## 2020-09-10 ASSESSMENT — PAIN DESCRIPTION - DESCRIPTORS: DESCRIPTORS: HEADACHE

## 2020-09-10 NOTE — PROGRESS NOTES
Contacted Dr John Alston office and spoke with her mackenzie nurse to message physician regarding plan to DC, recommendations, ? followup with ID.   Electronically signed by Elissa Lindsay RN on 9/10/2020 at 10:33 AM

## 2020-09-10 NOTE — PROGRESS NOTES
Discussed AVS with patient as well as dressing changes/wound care. Dressing supplies for several days provided. Patient voiced understanding of AVS and all questions were answered. Also instructed and wrote out when to make dose changes on Eliquis per AVS instructions. Further instructed patient to talk to PCP about continued anticoagulation after completion of his starter pack for DVT.     Electronically signed by Arlene Zafar RN on 9/10/2020 at 12:36 PM

## 2020-09-10 NOTE — DISCHARGE SUMMARY
Discharge Summary      Date:9/10/2020        Patient Name:Danie Lainez     YOB: 1980     Age:40 y.o. Admit Date:9/7/2020   Admission Condition:poor   Discharged Condition:stable  Discharge Date: 09/10/20       Discharge Diagnoses   Active Problems:    Cellulitis  Resolved Problems:    * No resolved hospital problems. [facility] Stay   Narrative of Hospital Course:     78-year-old male with a history of ADHD, anxiety, PTSD, recent diagnosis of DVT who presented from outside facility with concerns of worsening left lower extremity cellulitis.  Treated with IV cefazolin for severe left LE cellulitis with associated blisters, ID evaluated in house, patient with significant improvement, was transitioned to oral Keflex 500 mg 4 times daily for 7 more days, encouraged to continue elevation of left lower extremity after discharge home, patient is to continue oral Lasix, as well as follow-up with his primary care doctor in about 3 to 5 days for continuous management of his lower extremity cellulitis as well as release back to work. Patient is to continue his Eliquis for recent diagnosis of left lower extremity DVT.       Physical Exam  General: Alert, well-developed, no acute distress lying comfortably in bed. HEENT: Atraumatic normocephalic, range of motion normal.  Cardiac: Normal S1-S2 no murmur. Respiratory: Effort normal, breath sounds normal, clear To auscultation bilaterally, no rhonchi or rales, no wheezing  Abdomen: Soft, positive bowel sounds in all quadrants, no distention, nontender to palpation. MSK/extremities: Positive left lower extremity tenderness and improved erythema, + edema, left lower leg wrapped in Kerlix  Skin: no bruising and no lesions noted.   Psych: Affect normal and good eye contact, behavioral normal, cognition mildly impaired   neurological: No focal deficits, alert and conversant      Consultants:   IP CONSULT TO INFECTIOUS DISEASES    Time Spent on Discharge:  >30 minutes were spent in patient examination, evaluation, counseling as well as medication reconciliation, prescriptions for required medications, discharge plan and follow up. Surgeries/Procedures Performed:  NONE      Significant Diagnostic Studies:   Recent Labs:  CBC:   Lab Results   Component Value Date    WBC 6.8 09/10/2020    RBC 4.22 09/10/2020    HGB 12.9 09/10/2020    HCT 39.3 09/10/2020    MCV 93.1 09/10/2020    MCH 30.6 09/10/2020    MCHC 32.8 09/10/2020    RDW 13.3 09/10/2020     09/10/2020     BMP:    Lab Results   Component Value Date    GLUCOSE 100 09/10/2020     09/10/2020    K 4.3 09/10/2020    CL 94 09/10/2020    CO2 32 09/10/2020    ANIONGAP 11 09/10/2020    BUN 13 09/10/2020    CREATININE 0.7 09/10/2020    CALCIUM 8.9 09/10/2020    LABGLOM >60 09/10/2020    GFRAA >59 09/10/2020       Radiology Last 7 Days:  No results found. Discharge Plan   Disposition: Home    Provider Follow-Up:   PAMELA Davis - CNP  [facility]  [phone]    On 9/17/2020  Hospital follow up for 9/17/20 @ 1:00 p.m.     Zachariah Santacruz MD  [facility]  [address] [id number] [phone]      As needed         Patient Instructions   Diet: regular diet    Activity: activity as tolerated      Discharge Medications         Medication List      START taking these medications    cephALEXin 500 MG capsule  Commonly known as:  KEFLEX  Take 1 capsule by mouth 4 times daily for 7 days     senna 8.6 MG tablet  Commonly known as:  SENOKOT  Take 1 tablet by mouth nightly        CHANGE how you take these medications    furosemide 40 MG tablet  Commonly known as:  Lasix  Take 1 tablet by mouth daily  What changed:    · medication strength  · how much to take  · when to take this  · reasons to take this        CONTINUE taking these medications    albuterol sulfate  (90 Base) MCG/ACT inhaler  Commonly known as:  Ventolin HFA  Inhale 2 puffs into the lungs every 6 hours as needed for Wheezing     ALPRAZolam 1 MG tablet  Commonly known as:  XANAX     amphetamine-dextroamphetamine 30 MG tablet  Commonly known as:  ADDERALL     apixaban 5 MG Tabs tablet  Commonly known as:  Eliquis DVT/PE Starter Pack  Take 10 mg (2 tablets) orally twice daily for 7 days, then take 5 mg (1 tablet) orally twice daily thereafter.      BUPRENORPHINE HCL SL     ibuprofen 800 MG tablet  Commonly known as:  ADVIL;MOTRIN     omeprazole 20 MG delayed release capsule  Commonly known as:  PRILOSEC     potassium chloride 20 MEQ Tbcr extended release tablet  Commonly known as:  KLOR-CON M        STOP taking these medications    sulfamethoxazole-trimethoprim 800-160 MG per tablet  Commonly known as:  BACTRIM DS;SEPTRA DS           Where to Get Your Medications      These medications were sent to [id number] - [place], [place] - [address]  [address] [address]    Phone:  [phone]   · cephALEXin 500 MG capsule  · furosemide 40 MG tablet  · senna 8.6 MG tablet         Electronically signed by Estuardo Hayden MD on 9/10/20 at 11:27 AM CDT

## 2021-01-02 ENCOUNTER — HOSPITAL ENCOUNTER (EMERGENCY)
Age: 41
Discharge: LEFT BEFORE DISCHARGE | End: 2021-01-03
Payer: COMMERCIAL

## 2021-01-02 VITALS
RESPIRATION RATE: 17 BRPM | HEIGHT: 70 IN | BODY MASS INDEX: 45.1 KG/M2 | TEMPERATURE: 97.8 F | HEART RATE: 88 BPM | DIASTOLIC BLOOD PRESSURE: 84 MMHG | SYSTOLIC BLOOD PRESSURE: 135 MMHG | OXYGEN SATURATION: 96 % | WEIGHT: 315 LBS

## 2021-10-13 ENCOUNTER — OFFICE VISIT (OUTPATIENT)
Dept: FAMILY MEDICINE CLINIC | Facility: CLINIC | Age: 41
End: 2021-10-13

## 2021-10-13 VITALS
OXYGEN SATURATION: 95 % | HEART RATE: 105 BPM | SYSTOLIC BLOOD PRESSURE: 120 MMHG | BODY MASS INDEX: 45.1 KG/M2 | DIASTOLIC BLOOD PRESSURE: 74 MMHG | HEIGHT: 70 IN | WEIGHT: 315 LBS | TEMPERATURE: 97.8 F | RESPIRATION RATE: 18 BRPM

## 2021-10-13 DIAGNOSIS — E66.01 MORBID OBESITY (HCC): Primary | ICD-10-CM

## 2021-10-13 PROBLEM — E55.9 VITAMIN D DEFICIENCY: Status: ACTIVE | Noted: 2021-10-13

## 2021-10-13 PROBLEM — I87.329 STASIS DERMATITIS OF LOWER EXTREMITY DUE TO CHRONIC PERIPHERAL VASCULAR HYPERTENSION: Status: ACTIVE | Noted: 2021-01-27

## 2021-10-13 PROBLEM — M79.89 MASS OF SOFT TISSUE: Status: ACTIVE | Noted: 2021-10-13

## 2021-10-13 PROBLEM — I50.9 CONGESTIVE HEART FAILURE (HCC): Status: ACTIVE | Noted: 2021-03-05

## 2021-10-13 PROBLEM — I87.002 POST-THROMBOTIC SYNDROME OF LEFT LOWER EXTREMITY: Status: ACTIVE | Noted: 2020-11-02

## 2021-10-13 PROBLEM — M79.673 HEEL PAIN: Status: ACTIVE | Noted: 2021-10-13

## 2021-10-13 PROBLEM — F81.9 LEARNING DIFFICULTY: Status: ACTIVE | Noted: 2020-11-02

## 2021-10-13 PROBLEM — M79.604 PAIN OF RIGHT LOWER EXTREMITY: Status: ACTIVE | Noted: 2021-10-13

## 2021-10-13 PROBLEM — I10 ESSENTIAL HYPERTENSION: Status: ACTIVE | Noted: 2021-10-13

## 2021-10-13 PROBLEM — Z91.199 NONCOMPLIANCE WITH TREATMENT: Status: ACTIVE | Noted: 2020-06-22

## 2021-10-13 PROBLEM — E29.1 MALE HYPOGONADISM: Status: ACTIVE | Noted: 2017-07-14

## 2021-10-13 PROBLEM — R80.9 PROTEINURIA: Status: ACTIVE | Noted: 2017-12-11

## 2021-10-13 PROBLEM — F11.20 OPIOID DEPENDENCE (HCC): Status: ACTIVE | Noted: 2017-06-20

## 2021-10-13 PROBLEM — I51.7 LEFT ATRIAL ENLARGEMENT: Status: ACTIVE | Noted: 2017-12-04

## 2021-10-13 PROBLEM — Z76.5 DRUG-SEEKING BEHAVIOR: Status: ACTIVE | Noted: 2020-06-02

## 2021-10-13 PROBLEM — I87.309 VENOUS HYPERTENSION OF LOWER EXTREMITY: Status: ACTIVE | Noted: 2021-02-01

## 2021-10-13 PROBLEM — L25.9 CONTACT DERMATITIS: Status: ACTIVE | Noted: 2021-10-13

## 2021-10-13 PROBLEM — I51.9 HEART DISEASE: Status: ACTIVE | Noted: 2020-06-22

## 2021-10-13 PROBLEM — I87.399 CHRONIC PERIPHERAL VENOUS HYPERTENSION WITH LOWER EXTREMITY COMPLICATION: Status: ACTIVE | Noted: 2021-01-27

## 2021-10-13 PROBLEM — R68.82 REDUCED LIBIDO: Status: ACTIVE | Noted: 2021-10-13

## 2021-10-13 PROBLEM — M25.529 ELBOW JOINT PAIN: Status: ACTIVE | Noted: 2021-10-13

## 2021-10-13 PROBLEM — E78.5 HYPERLIPIDEMIA: Status: ACTIVE | Noted: 2021-10-13

## 2021-10-13 PROBLEM — R41.3 POOR SHORT-TERM MEMORY: Status: ACTIVE | Noted: 2020-11-02

## 2021-10-13 PROBLEM — R60.9 EDEMA: Status: ACTIVE | Noted: 2017-12-11

## 2021-10-13 PROBLEM — Z20.822 SUSPECTED SEVERE ACUTE RESPIRATORY SYNDROME CORONAVIRUS 2 (SARS-COV-2) INFECTION: Status: ACTIVE | Noted: 2020-08-11

## 2021-10-13 PROBLEM — R93.1 DECREASED CARDIAC EJECTION FRACTION: Status: ACTIVE | Noted: 2017-12-04

## 2021-10-13 PROBLEM — I73.9 INTERMITTENT CLAUDICATION (HCC): Status: ACTIVE | Noted: 2020-10-26

## 2021-10-13 PROBLEM — R60.0 EDEMA OF LOWER EXTREMITY: Status: ACTIVE | Noted: 2021-01-27

## 2021-10-13 PROBLEM — L03.119 CELLULITIS OF LOWER LIMB: Status: ACTIVE | Noted: 2020-09-22

## 2021-10-13 PROBLEM — K21.9 GASTROESOPHAGEAL REFLUX DISEASE: Status: ACTIVE | Noted: 2020-10-26

## 2021-10-13 RX ORDER — CLONAZEPAM 0.5 MG/1
1 TABLET ORAL NIGHTLY
COMMUNITY

## 2021-10-13 RX ORDER — ALPRAZOLAM 1 MG/1
1 TABLET ORAL NIGHTLY
COMMUNITY

## 2021-10-13 RX ORDER — BUPRENORPHINE HYDROCHLORIDE 8 MG/1
3 TABLET SUBLINGUAL DAILY
COMMUNITY
Start: 2021-09-13 | End: 2021-10-13 | Stop reason: SDUPTHER

## 2021-10-13 RX ORDER — APIXABAN 5 MG/1
1 TABLET, FILM COATED ORAL 2 TIMES DAILY
COMMUNITY
Start: 2021-09-30

## 2021-10-13 RX ORDER — POTASSIUM CHLORIDE 750 MG/1
1 CAPSULE, EXTENDED RELEASE ORAL 2 TIMES DAILY
COMMUNITY
Start: 2021-09-20

## 2021-10-13 RX ORDER — ARIPIPRAZOLE 5 MG/1
1 TABLET ORAL DAILY
COMMUNITY

## 2021-10-13 RX ORDER — BUPROPION HYDROCHLORIDE 75 MG/1
2 TABLET ORAL DAILY
COMMUNITY

## 2021-10-13 RX ORDER — ERGOCALCIFEROL 1.25 MG/1
1 CAPSULE ORAL WEEKLY
COMMUNITY

## 2021-10-13 RX ORDER — FUROSEMIDE 40 MG/1
1 TABLET ORAL 2 TIMES DAILY
COMMUNITY
Start: 2021-09-20

## 2021-10-13 RX ORDER — DEXTROAMPHETAMINE SACCHARATE, AMPHETAMINE ASPARTATE, DEXTROAMPHETAMINE SULFATE AND AMPHETAMINE SULFATE 7.5; 7.5; 7.5; 7.5 MG/1; MG/1; MG/1; MG/1
1 TABLET ORAL 2 TIMES DAILY
COMMUNITY

## 2021-10-13 NOTE — PROGRESS NOTES
Mr Delgado is here today for initial visit ---we briefly reviewed his medications and I informed him I would not be able to take on his case due to the complexity of his case and his medications.

## 2023-03-03 ENCOUNTER — OFFICE VISIT (OUTPATIENT)
Dept: FAMILY MEDICINE CLINIC | Facility: CLINIC | Age: 43
End: 2023-03-03
Payer: MEDICARE

## 2023-03-03 VITALS
TEMPERATURE: 98.5 F | HEIGHT: 70 IN | OXYGEN SATURATION: 96 % | WEIGHT: 315 LBS | DIASTOLIC BLOOD PRESSURE: 90 MMHG | HEART RATE: 78 BPM | RESPIRATION RATE: 16 BRPM | BODY MASS INDEX: 45.1 KG/M2 | SYSTOLIC BLOOD PRESSURE: 152 MMHG

## 2023-03-03 DIAGNOSIS — M79.89 LEG SWELLING: Primary | ICD-10-CM

## 2023-03-03 NOTE — PROGRESS NOTES
"He is noting left leg swelling despite the use of eliquis--I suggested er --he thinks \"weed killer \" is using may be causing some trouble--I suggested going to the er  "

## 2023-04-25 ENCOUNTER — TRANSCRIBE ORDERS (OUTPATIENT)
Dept: ADMINISTRATIVE | Facility: HOSPITAL | Age: 43
End: 2023-04-25
Payer: MEDICARE

## 2023-04-25 DIAGNOSIS — R00.0 TACHYCARDIA: Primary | ICD-10-CM

## 2024-03-28 ENCOUNTER — TELEPHONE (OUTPATIENT)
Dept: HEMATOLOGY | Age: 44
End: 2024-03-28

## 2024-03-28 NOTE — TELEPHONE ENCOUNTER
CALLED AND LET THEM KNOW AT [facility] WE DO NOT ACCEPT [place] MEDICAID PLEASE REFER HIM SOMEWHERE THAT ACCEPTS HIS INSURANCE.   KS 3/28/24